# Patient Record
Sex: FEMALE | Race: WHITE | Employment: OTHER | URBAN - METROPOLITAN AREA
[De-identification: names, ages, dates, MRNs, and addresses within clinical notes are randomized per-mention and may not be internally consistent; named-entity substitution may affect disease eponyms.]

---

## 2019-02-20 ENCOUNTER — OFFICE VISIT (OUTPATIENT)
Dept: FAMILY MEDICINE CLINIC | Facility: CLINIC | Age: 84
End: 2019-02-20
Payer: MEDICARE

## 2019-02-20 DIAGNOSIS — Z23 NEED FOR VACCINATION: Primary | ICD-10-CM

## 2019-02-20 PROCEDURE — 90732 PPSV23 VACC 2 YRS+ SUBQ/IM: CPT | Performed by: NURSE PRACTITIONER

## 2019-02-20 PROCEDURE — G0009 ADMIN PNEUMOCOCCAL VACCINE: HCPCS | Performed by: NURSE PRACTITIONER

## 2019-02-20 RX ORDER — ENALAPRIL MALEATE AND HYDROCHLOROTHIAZIDE 10; 25 MG/1; MG/1
1 TABLET ORAL
Refills: 1 | COMMUNITY
Start: 2018-12-10

## 2019-02-20 RX ORDER — SIMVASTATIN 20 MG
20 TABLET ORAL
Refills: 2 | COMMUNITY
Start: 2019-01-30

## 2019-02-20 NOTE — PROGRESS NOTES
Pt presented requesting Pneumovax 23 vaccine; reports PCP said she needed vaccine but didn't have in stock at office yesterday during visit. Vaccination Screening Questionnaire filled out by patient and reviewed by myself.   No contraindications to vaccina

## 2020-11-02 ENCOUNTER — IMMUNIZATION (OUTPATIENT)
Dept: FAMILY MEDICINE CLINIC | Facility: CLINIC | Age: 85
End: 2020-11-02
Payer: MEDICARE

## 2020-11-02 PROCEDURE — 90662 IIV NO PRSV INCREASED AG IM: CPT | Performed by: NURSE PRACTITIONER

## 2020-11-02 PROCEDURE — G0008 ADMIN INFLUENZA VIRUS VAC: HCPCS | Performed by: NURSE PRACTITIONER

## 2021-03-12 ENCOUNTER — HOSPITAL ENCOUNTER (EMERGENCY)
Facility: HOSPITAL | Age: 86
Discharge: HOME | End: 2021-03-12
Attending: EMERGENCY MEDICINE
Payer: MEDICARE

## 2021-03-12 ENCOUNTER — APPOINTMENT (OUTPATIENT)
Dept: CT IMAGING | Facility: HOSPITAL | Age: 86
End: 2021-03-12
Attending: EMERGENCY MEDICINE
Payer: MEDICARE

## 2021-03-12 ENCOUNTER — APPOINTMENT (OUTPATIENT)
Dept: GENERAL RADIOLOGY | Facility: HOSPITAL | Age: 86
End: 2021-03-12
Attending: EMERGENCY MEDICINE
Payer: MEDICARE

## 2021-03-12 VITALS
TEMPERATURE: 98 F | SYSTOLIC BLOOD PRESSURE: 140 MMHG | OXYGEN SATURATION: 98 % | BODY MASS INDEX: 30 KG/M2 | DIASTOLIC BLOOD PRESSURE: 58 MMHG | HEART RATE: 79 BPM | WEIGHT: 174.19 LBS | RESPIRATION RATE: 20 BRPM

## 2021-03-12 DIAGNOSIS — K80.21 CALCULUS OF GALLBLADDER WITH BILIARY OBSTRUCTION BUT WITHOUT CHOLECYSTITIS: ICD-10-CM

## 2021-03-12 DIAGNOSIS — K82.8 PORCELAIN GALLBLADDER: ICD-10-CM

## 2021-03-12 DIAGNOSIS — R10.13 EPIGASTRIC PAIN: Primary | ICD-10-CM

## 2021-03-12 LAB
ALBUMIN SERPL-MCNC: 3.6 G/DL (ref 3.4–5)
ALBUMIN/GLOB SERPL: 1.1 {RATIO} (ref 1–2)
ALP LIVER SERPL-CCNC: 60 U/L
ALT SERPL-CCNC: 25 U/L
ANION GAP SERPL CALC-SCNC: 4 MMOL/L (ref 0–18)
APTT PPP: 31.1 SECONDS (ref 25.4–36.1)
AST SERPL-CCNC: 16 U/L (ref 15–37)
BASOPHILS # BLD AUTO: 0.01 X10(3) UL (ref 0–0.2)
BASOPHILS NFR BLD AUTO: 0.1 %
BILIRUB SERPL-MCNC: 0.4 MG/DL (ref 0.1–2)
BUN BLD-MCNC: 16 MG/DL (ref 7–18)
BUN/CREAT SERPL: 28.1 (ref 10–20)
CALCIUM BLD-MCNC: 9.1 MG/DL (ref 8.5–10.1)
CHLORIDE SERPL-SCNC: 99 MMOL/L (ref 98–112)
CO2 SERPL-SCNC: 30 MMOL/L (ref 21–32)
CREAT BLD-MCNC: 0.57 MG/DL
D-DIMER: 0.5 UG/ML FEU (ref ?–0.85)
DEPRECATED RDW RBC AUTO: 44.4 FL (ref 35.1–46.3)
EOSINOPHIL # BLD AUTO: 0.01 X10(3) UL (ref 0–0.7)
EOSINOPHIL NFR BLD AUTO: 0.1 %
ERYTHROCYTE [DISTWIDTH] IN BLOOD BY AUTOMATED COUNT: 13.3 % (ref 11–15)
GLOBULIN PLAS-MCNC: 3.4 G/DL (ref 2.8–4.4)
GLUCOSE BLD-MCNC: 115 MG/DL (ref 70–99)
HCT VFR BLD AUTO: 39.3 %
HGB BLD-MCNC: 13.2 G/DL
IMM GRANULOCYTES # BLD AUTO: 0.03 X10(3) UL (ref 0–1)
IMM GRANULOCYTES NFR BLD: 0.4 %
INR BLD: 0.9 (ref 0.89–1.11)
LIPASE SERPL-CCNC: 148 U/L (ref 73–393)
LYMPHOCYTES # BLD AUTO: 0.7 X10(3) UL (ref 1–4)
LYMPHOCYTES NFR BLD AUTO: 8.9 %
M PROTEIN MFR SERPL ELPH: 7 G/DL (ref 6.4–8.2)
MCH RBC QN AUTO: 30.5 PG (ref 26–34)
MCHC RBC AUTO-ENTMCNC: 33.6 G/DL (ref 31–37)
MCV RBC AUTO: 90.8 FL
MONOCYTES # BLD AUTO: 0.51 X10(3) UL (ref 0.1–1)
MONOCYTES NFR BLD AUTO: 6.5 %
NEUTROPHILS # BLD AUTO: 6.61 X10 (3) UL (ref 1.5–7.7)
NEUTROPHILS # BLD AUTO: 6.61 X10(3) UL (ref 1.5–7.7)
NEUTROPHILS NFR BLD AUTO: 84 %
NT-PROBNP SERPL-MCNC: 296 PG/ML (ref ?–450)
OSMOLALITY SERPL CALC.SUM OF ELEC: 278 MOSM/KG (ref 275–295)
PLATELET # BLD AUTO: 233 10(3)UL (ref 150–450)
POTASSIUM SERPL-SCNC: 3.6 MMOL/L (ref 3.5–5.1)
PSA SERPL DL<=0.01 NG/ML-MCNC: 12.4 SECONDS (ref 12.4–14.6)
RBC # BLD AUTO: 4.33 X10(6)UL
SARS-COV-2 RNA RESP QL NAA+PROBE: NOT DETECTED
SODIUM SERPL-SCNC: 133 MMOL/L (ref 136–145)
TROPONIN I SERPL-MCNC: <0.045 NG/ML (ref ?–0.04)
WBC # BLD AUTO: 7.9 X10(3) UL (ref 4–11)

## 2021-03-12 PROCEDURE — 84484 ASSAY OF TROPONIN QUANT: CPT | Performed by: EMERGENCY MEDICINE

## 2021-03-12 PROCEDURE — 93005 ELECTROCARDIOGRAM TRACING: CPT

## 2021-03-12 PROCEDURE — 85379 FIBRIN DEGRADATION QUANT: CPT | Performed by: EMERGENCY MEDICINE

## 2021-03-12 PROCEDURE — 71045 X-RAY EXAM CHEST 1 VIEW: CPT | Performed by: EMERGENCY MEDICINE

## 2021-03-12 PROCEDURE — 93010 ELECTROCARDIOGRAM REPORT: CPT

## 2021-03-12 PROCEDURE — 83690 ASSAY OF LIPASE: CPT | Performed by: EMERGENCY MEDICINE

## 2021-03-12 PROCEDURE — 80053 COMPREHEN METABOLIC PANEL: CPT | Performed by: EMERGENCY MEDICINE

## 2021-03-12 PROCEDURE — 83880 ASSAY OF NATRIURETIC PEPTIDE: CPT | Performed by: EMERGENCY MEDICINE

## 2021-03-12 PROCEDURE — 99285 EMERGENCY DEPT VISIT HI MDM: CPT

## 2021-03-12 PROCEDURE — 85730 THROMBOPLASTIN TIME PARTIAL: CPT | Performed by: EMERGENCY MEDICINE

## 2021-03-12 PROCEDURE — 85610 PROTHROMBIN TIME: CPT | Performed by: EMERGENCY MEDICINE

## 2021-03-12 PROCEDURE — 85025 COMPLETE CBC W/AUTO DIFF WBC: CPT | Performed by: EMERGENCY MEDICINE

## 2021-03-12 PROCEDURE — 74177 CT ABD & PELVIS W/CONTRAST: CPT | Performed by: EMERGENCY MEDICINE

## 2021-03-12 PROCEDURE — 96374 THER/PROPH/DIAG INJ IV PUSH: CPT

## 2021-03-12 RX ORDER — KETOROLAC TROMETHAMINE 30 MG/ML
15 INJECTION, SOLUTION INTRAMUSCULAR; INTRAVENOUS ONCE
Status: COMPLETED | OUTPATIENT
Start: 2021-03-12 | End: 2021-03-12

## 2021-03-12 NOTE — ED INITIAL ASSESSMENT (HPI)
Per patient, epigastric pain that started last night and has not gone away. Stated after a large meal with sauteed onions that usually triggers her GERD. + nausea. Denies other complaints at this time.

## 2021-03-13 NOTE — ED NOTES
Patient reports pain is improving, would like to go home. MD updated, patient given water as requested.

## 2021-03-13 NOTE — ED PROVIDER NOTES
Patient Seen in: [facility] Emergency Department      History   Patient presents with:  Chest Pain Angina    Stated Complaint: Chest pain; epigastric pain    HPI/Subjective:   HPI    44-year-old female presents emergency room with chief complaint of awake, alert, well-appearing, in no acute distress. HEENT:  no scleral icterus. Mucous membranes are moist, oropharynx is clear, uvula midline. HEART: Regular rate and rhythm, no murmurs. LUNGS: Clear to auscultation bilaterally.   No Rales, no rhonch established, placed on cardiac monitor and pulse ox. Chest x-ray performed, no acute cardiopulmonary process. Blood work performed, chemistry sodium 133 potassium 3.6 BUN 16 creatinine 0.57 glucose 115. Lipase 148. Troponin less than 0.045. D-dimer 0.

## 2021-03-15 LAB
ATRIAL RATE: 71 BPM
P AXIS: 57 DEGREES
P-R INTERVAL: 168 MS
Q-T INTERVAL: 390 MS
QRS DURATION: 82 MS
QTC CALCULATION (BEZET): 423 MS
R AXIS: 6 DEGREES
T AXIS: 45 DEGREES
VENTRICULAR RATE: 71 BPM

## 2021-03-19 ENCOUNTER — OFFICE VISIT (OUTPATIENT)
Dept: SURGERY | Facility: CLINIC | Age: 86
End: 2021-03-19
Payer: MEDICARE

## 2021-03-19 VITALS — WEIGHT: 169.5 LBS | BODY MASS INDEX: 28.94 KG/M2 | TEMPERATURE: 97 F | HEIGHT: 64 IN

## 2021-03-19 DIAGNOSIS — K80.20 CALCULUS OF GALLBLADDER WITHOUT CHOLECYSTITIS WITHOUT OBSTRUCTION: ICD-10-CM

## 2021-03-19 DIAGNOSIS — K82.8 PORCELAIN GALLBLADDER: Primary | ICD-10-CM

## 2021-03-19 PROCEDURE — 99205 OFFICE O/P NEW HI 60 MIN: CPT | Performed by: SURGERY

## 2021-03-24 NOTE — H&P
New Patient Visit Note       Active Problems      1. Porcelain gallbladder    2. Calculus of gallbladder without cholecystitis without obstruction        Chief Complaint   Patient presents with:  Gallbladder: NW PT ref by ER for gallbladder.  PT denies any not bruise/bleed easily. Psychiatric/Behavioral: Negative for confusion, hallucinations and sleep disturbance.        Physical Findings   Temp 97.2 °F (36.2 °C)   Ht 64\"   Wt 169 lb 8 oz (76.9 kg)   BMI 29.09 kg/m²   Physical Exam  Constitutional: pain, abdominal bloating and soft stools.     On examination, her abdomen is soft, nondistended, nontender to deep palpation    Patient has no prior history of any surgery    Family history-mother had cholecystectomy, brother diagnosed with pancreatic carci Referrals   None    Follow Up  No follow-ups on file.     Chasidy Rojas MD

## 2021-04-02 ENCOUNTER — IMMUNIZATION (OUTPATIENT)
Dept: LAB | Age: 86
End: 2021-04-02
Attending: HOSPITALIST
Payer: MEDICARE

## 2021-04-02 DIAGNOSIS — Z23 NEED FOR VACCINATION: Primary | ICD-10-CM

## 2021-04-02 PROCEDURE — 0001A SARSCOV2 VAC 30MCG/0.3ML IM: CPT

## 2021-04-23 ENCOUNTER — IMMUNIZATION (OUTPATIENT)
Dept: LAB | Age: 86
End: 2021-04-23
Attending: HOSPITALIST
Payer: MEDICARE

## 2021-04-23 DIAGNOSIS — Z23 NEED FOR VACCINATION: Primary | ICD-10-CM

## 2021-04-23 PROCEDURE — 0002A SARSCOV2 VAC 30MCG/0.3ML IM: CPT

## 2022-01-06 ENCOUNTER — IMMUNIZATION (OUTPATIENT)
Dept: LAB | Facility: HOSPITAL | Age: 87
End: 2022-01-06
Attending: EMERGENCY MEDICINE
Payer: MEDICARE

## 2022-01-06 DIAGNOSIS — Z23 NEED FOR VACCINATION: Primary | ICD-10-CM

## 2022-01-06 PROCEDURE — 0004A SARSCOV2 VAC 30MCG/0.3ML IM: CPT

## 2022-01-06 PROCEDURE — 0054A SARSCOV2 VAC 30MCG/0.3ML IM: CPT

## 2023-01-05 ENCOUNTER — HOSPITAL ENCOUNTER (OUTPATIENT)
Dept: MRI IMAGING | Age: 88
Discharge: HOME | End: 2023-01-05
Attending: INTERNAL MEDICINE
Payer: MEDICARE

## 2023-01-05 ENCOUNTER — HOSPITAL ENCOUNTER (OUTPATIENT)
Dept: MRI IMAGING | Age: 88
Discharge: HOME | DRG: 540 | End: 2023-01-05
Attending: INTERNAL MEDICINE
Payer: MEDICARE

## 2023-01-05 DIAGNOSIS — L03.011 CELLULITIS OF RIGHT RING FINGER: ICD-10-CM

## 2023-01-05 PROCEDURE — 73218 MRI UPPER EXTREMITY W/O DYE: CPT | Performed by: INTERNAL MEDICINE

## 2023-01-06 ENCOUNTER — HOSPITAL ENCOUNTER (INPATIENT)
Facility: HOSPITAL | Age: 88
LOS: 3 days | Discharge: HOME | End: 2023-01-09
Attending: EMERGENCY MEDICINE | Admitting: HOSPITALIST
Payer: MEDICARE

## 2023-01-06 ENCOUNTER — APPOINTMENT (OUTPATIENT)
Dept: GENERAL RADIOLOGY | Facility: HOSPITAL | Age: 88
DRG: 540 | End: 2023-01-06
Attending: EMERGENCY MEDICINE
Payer: MEDICARE

## 2023-01-06 ENCOUNTER — HOSPITAL ENCOUNTER (INPATIENT)
Facility: HOSPITAL | Age: 88
LOS: 3 days | Discharge: HOME | DRG: 540 | End: 2023-01-09
Attending: EMERGENCY MEDICINE | Admitting: HOSPITALIST
Payer: MEDICARE

## 2023-01-06 ENCOUNTER — APPOINTMENT (OUTPATIENT)
Dept: GENERAL RADIOLOGY | Facility: HOSPITAL | Age: 88
End: 2023-01-06
Attending: EMERGENCY MEDICINE
Payer: MEDICARE

## 2023-01-06 DIAGNOSIS — M86.9 OSTEOMYELITIS OF RIGHT HAND, UNSPECIFIED TYPE (HCC): Primary | ICD-10-CM

## 2023-01-06 DIAGNOSIS — L03.011 CELLULITIS OF RIGHT FINGER: ICD-10-CM

## 2023-01-06 LAB
ALBUMIN SERPL-MCNC: 3.4 G/DL (ref 3.4–5)
ALBUMIN/GLOB SERPL: 1 {RATIO} (ref 1–2)
ALP LIVER SERPL-CCNC: 61 U/L
ALT SERPL-CCNC: 21 U/L
ANION GAP SERPL CALC-SCNC: 3 MMOL/L (ref 0–18)
AST SERPL-CCNC: 20 U/L (ref 15–37)
BASOPHILS # BLD AUTO: 0.02 X10(3) UL (ref 0–0.2)
BASOPHILS NFR BLD AUTO: 0.4 %
BILIRUB SERPL-MCNC: 0.5 MG/DL (ref 0.1–2)
BUN BLD-MCNC: 20 MG/DL (ref 7–18)
CALCIUM BLD-MCNC: 9.3 MG/DL (ref 8.5–10.1)
CHLORIDE SERPL-SCNC: 104 MMOL/L (ref 98–112)
CO2 SERPL-SCNC: 32 MMOL/L (ref 21–32)
CREAT BLD-MCNC: 0.68 MG/DL
CRP SERPL-MCNC: <0.29 MG/DL (ref ?–0.3)
EOSINOPHIL # BLD AUTO: 0.03 X10(3) UL (ref 0–0.7)
EOSINOPHIL NFR BLD AUTO: 0.7 %
ERYTHROCYTE [DISTWIDTH] IN BLOOD BY AUTOMATED COUNT: 15 %
ERYTHROCYTE [SEDIMENTATION RATE] IN BLOOD: 26 MM/HR
GFR SERPLBLD BASED ON 1.73 SQ M-ARVRAT: 84 ML/MIN/1.73M2 (ref 60–?)
GLOBULIN PLAS-MCNC: 3.5 G/DL (ref 2.8–4.4)
GLUCOSE BLD-MCNC: 116 MG/DL (ref 70–99)
HCT VFR BLD AUTO: 40.7 %
HGB BLD-MCNC: 13.3 G/DL
IMM GRANULOCYTES # BLD AUTO: 0.02 X10(3) UL (ref 0–1)
IMM GRANULOCYTES NFR BLD: 0.4 %
LYMPHOCYTES # BLD AUTO: 0.91 X10(3) UL (ref 1–4)
LYMPHOCYTES NFR BLD AUTO: 19.8 %
MCH RBC QN AUTO: 29.6 PG (ref 26–34)
MCHC RBC AUTO-ENTMCNC: 32.7 G/DL (ref 31–37)
MCV RBC AUTO: 90.6 FL
MONOCYTES # BLD AUTO: 0.47 X10(3) UL (ref 0.1–1)
MONOCYTES NFR BLD AUTO: 10.2 %
NEUTROPHILS # BLD AUTO: 3.14 X10 (3) UL (ref 1.5–7.7)
NEUTROPHILS # BLD AUTO: 3.14 X10(3) UL (ref 1.5–7.7)
NEUTROPHILS NFR BLD AUTO: 68.5 %
OSMOLALITY SERPL CALC.SUM OF ELEC: 292 MOSM/KG (ref 275–295)
PLATELET # BLD AUTO: 283 10(3)UL (ref 150–450)
POTASSIUM SERPL-SCNC: 3.9 MMOL/L (ref 3.5–5.1)
PROT SERPL-MCNC: 6.9 G/DL (ref 6.4–8.2)
RBC # BLD AUTO: 4.49 X10(6)UL
SARS-COV-2 RNA RESP QL NAA+PROBE: NOT DETECTED
SODIUM SERPL-SCNC: 139 MMOL/L (ref 136–145)
WBC # BLD AUTO: 4.6 X10(3) UL (ref 4–11)

## 2023-01-06 PROCEDURE — 85025 COMPLETE CBC W/AUTO DIFF WBC: CPT | Performed by: EMERGENCY MEDICINE

## 2023-01-06 PROCEDURE — 87081 CULTURE SCREEN ONLY: CPT | Performed by: HOSPITALIST

## 2023-01-06 PROCEDURE — 86140 C-REACTIVE PROTEIN: CPT | Performed by: EMERGENCY MEDICINE

## 2023-01-06 PROCEDURE — 36415 COLL VENOUS BLD VENIPUNCTURE: CPT

## 2023-01-06 PROCEDURE — 99285 EMERGENCY DEPT VISIT HI MDM: CPT

## 2023-01-06 PROCEDURE — 73140 X-RAY EXAM OF FINGER(S): CPT | Performed by: EMERGENCY MEDICINE

## 2023-01-06 PROCEDURE — 85652 RBC SED RATE AUTOMATED: CPT | Performed by: EMERGENCY MEDICINE

## 2023-01-06 PROCEDURE — 80053 COMPREHEN METABOLIC PANEL: CPT | Performed by: EMERGENCY MEDICINE

## 2023-01-06 RX ORDER — ENEMA 19; 7 G/133ML; G/133ML
1 ENEMA RECTAL ONCE AS NEEDED
Status: DISCONTINUED | OUTPATIENT
Start: 2023-01-06 | End: 2023-01-09

## 2023-01-06 RX ORDER — ATORVASTATIN CALCIUM 10 MG/1
10 TABLET, FILM COATED ORAL NIGHTLY
Status: DISCONTINUED | OUTPATIENT
Start: 2023-01-06 | End: 2023-01-09

## 2023-01-06 RX ORDER — ACETAMINOPHEN 500 MG
500 TABLET ORAL EVERY 4 HOURS PRN
Status: DISCONTINUED | OUTPATIENT
Start: 2023-01-06 | End: 2023-01-09

## 2023-01-06 RX ORDER — BISACODYL 10 MG
10 SUPPOSITORY, RECTAL RECTAL
Status: DISCONTINUED | OUTPATIENT
Start: 2023-01-06 | End: 2023-01-09

## 2023-01-06 RX ORDER — HYDROCHLOROTHIAZIDE 25 MG/1
25 TABLET ORAL DAILY
COMMUNITY

## 2023-01-06 RX ORDER — HEPARIN SODIUM 5000 [USP'U]/ML
5000 INJECTION, SOLUTION INTRAVENOUS; SUBCUTANEOUS EVERY 8 HOURS SCHEDULED
Status: COMPLETED | OUTPATIENT
Start: 2023-01-06 | End: 2023-01-07

## 2023-01-06 RX ORDER — IBUPROFEN 400 MG/1
400 TABLET ORAL EVERY 6 HOURS PRN
Status: DISCONTINUED | OUTPATIENT
Start: 2023-01-06 | End: 2023-01-09

## 2023-01-06 RX ORDER — SENNOSIDES 8.6 MG
17.2 TABLET ORAL NIGHTLY PRN
Status: DISCONTINUED | OUTPATIENT
Start: 2023-01-06 | End: 2023-01-09

## 2023-01-06 RX ORDER — IBUPROFEN 200 MG
400 TABLET ORAL EVERY 6 HOURS PRN
COMMUNITY

## 2023-01-06 RX ORDER — POLYETHYLENE GLYCOL 3350 17 G/17G
17 POWDER, FOR SOLUTION ORAL DAILY PRN
Status: DISCONTINUED | OUTPATIENT
Start: 2023-01-06 | End: 2023-01-09

## 2023-01-06 RX ORDER — ENALAPRIL MALEATE AND HYDROCHLOROTHIAZIDE 10; 25 MG/1; MG/1
1 TABLET ORAL
Status: CANCELLED | OUTPATIENT
Start: 2023-01-07

## 2023-01-06 RX ORDER — ONDANSETRON 2 MG/ML
4 INJECTION INTRAMUSCULAR; INTRAVENOUS EVERY 6 HOURS PRN
Status: DISCONTINUED | OUTPATIENT
Start: 2023-01-06 | End: 2023-01-09

## 2023-01-06 RX ORDER — METOCLOPRAMIDE HYDROCHLORIDE 5 MG/ML
10 INJECTION INTRAMUSCULAR; INTRAVENOUS EVERY 8 HOURS PRN
Status: DISCONTINUED | OUTPATIENT
Start: 2023-01-06 | End: 2023-01-09

## 2023-01-06 RX ORDER — ENALAPRIL MALEATE 10 MG/1
10 TABLET ORAL DAILY
COMMUNITY

## 2023-01-06 NOTE — ED QUICK NOTES
Patient is alert and awake oriented x4  Complaining of pain and swelling with redness in right ring finger since one month  Already completed one course of antibiotic . Her doctor started clindamycin orally but she stopped after 2 doses because of diarrhea , no fever able to move the finger.

## 2023-01-06 NOTE — PROGRESS NOTES
NURSING ADMISSION NOTE      Patient admitted via cart from ED  Oriented to room. Safety precautions initiated. Bed in low position. Call light in reach. A&Ox4, VSS on room air. Denies pain or discomfort. Resting in bed, NPO at midnight.

## 2023-01-06 NOTE — ED QUICK NOTES
Orders for admission, patient is aware of plan and ready to go upstairs.  Any questions, please call ED RN Montse Jones at extension [phone]    Patient Covid vaccination status: Fully vaccinated     COVID Test Ordered in ED: Rapid SARS-CoV-2 by PCR    COVID Suspicion at Admission: N/A    Running Infusions:  None    Mental Status/LOC at time of transport:oriented x4    Other pertinent information: na  CIWA score: N/A   NIH score:  N/A

## 2023-01-07 ENCOUNTER — ANESTHESIA (OUTPATIENT)
Dept: SURGERY | Facility: HOSPITAL | Age: 88
End: 2023-01-07
Payer: MEDICARE

## 2023-01-07 ENCOUNTER — ANESTHESIA EVENT (OUTPATIENT)
Dept: SURGERY | Facility: HOSPITAL | Age: 88
End: 2023-01-07
Payer: MEDICARE

## 2023-01-07 LAB
ANION GAP SERPL CALC-SCNC: 4 MMOL/L (ref 0–18)
BASOPHILS # BLD AUTO: 0.02 X10(3) UL (ref 0–0.2)
BASOPHILS NFR BLD AUTO: 0.5 %
BUN BLD-MCNC: 18 MG/DL (ref 7–18)
CALCIUM BLD-MCNC: 9 MG/DL (ref 8.5–10.1)
CHLORIDE SERPL-SCNC: 107 MMOL/L (ref 98–112)
CO2 SERPL-SCNC: 28 MMOL/L (ref 21–32)
CREAT BLD-MCNC: 0.56 MG/DL
EOSINOPHIL # BLD AUTO: 0.05 X10(3) UL (ref 0–0.7)
EOSINOPHIL NFR BLD AUTO: 1.3 %
ERYTHROCYTE [DISTWIDTH] IN BLOOD BY AUTOMATED COUNT: 15 %
GFR SERPLBLD BASED ON 1.73 SQ M-ARVRAT: 88 ML/MIN/1.73M2 (ref 60–?)
GLUCOSE BLD-MCNC: 103 MG/DL (ref 70–99)
HCT VFR BLD AUTO: 36.9 %
HGB BLD-MCNC: 12 G/DL
IMM GRANULOCYTES # BLD AUTO: 0.01 X10(3) UL (ref 0–1)
IMM GRANULOCYTES NFR BLD: 0.3 %
LYMPHOCYTES # BLD AUTO: 1.13 X10(3) UL (ref 1–4)
LYMPHOCYTES NFR BLD AUTO: 28.9 %
MCH RBC QN AUTO: 29.4 PG (ref 26–34)
MCHC RBC AUTO-ENTMCNC: 32.5 G/DL (ref 31–37)
MCV RBC AUTO: 90.4 FL
MONOCYTES # BLD AUTO: 0.46 X10(3) UL (ref 0.1–1)
MONOCYTES NFR BLD AUTO: 11.8 %
NEUTROPHILS # BLD AUTO: 2.24 X10 (3) UL (ref 1.5–7.7)
NEUTROPHILS # BLD AUTO: 2.24 X10(3) UL (ref 1.5–7.7)
NEUTROPHILS NFR BLD AUTO: 57.2 %
OSMOLALITY SERPL CALC.SUM OF ELEC: 290 MOSM/KG (ref 275–295)
PLATELET # BLD AUTO: 233 10(3)UL (ref 150–450)
POTASSIUM SERPL-SCNC: 3.7 MMOL/L (ref 3.5–5.1)
RBC # BLD AUTO: 4.08 X10(6)UL
SODIUM SERPL-SCNC: 139 MMOL/L (ref 136–145)
WBC # BLD AUTO: 3.9 X10(3) UL (ref 4–11)

## 2023-01-07 PROCEDURE — 87205 SMEAR GRAM STAIN: CPT | Performed by: ORTHOPAEDIC SURGERY

## 2023-01-07 PROCEDURE — 87186 SC STD MICRODIL/AGAR DIL: CPT | Performed by: ORTHOPAEDIC SURGERY

## 2023-01-07 PROCEDURE — 87176 TISSUE HOMOGENIZATION CULTR: CPT | Performed by: ORTHOPAEDIC SURGERY

## 2023-01-07 PROCEDURE — 0X9J0ZZ DRAINAGE OF RIGHT HAND, OPEN APPROACH: ICD-10-PCS | Performed by: ORTHOPAEDIC SURGERY

## 2023-01-07 PROCEDURE — 87077 CULTURE AEROBIC IDENTIFY: CPT | Performed by: ORTHOPAEDIC SURGERY

## 2023-01-07 PROCEDURE — 80048 BASIC METABOLIC PNL TOTAL CA: CPT | Performed by: HOSPITALIST

## 2023-01-07 PROCEDURE — 85025 COMPLETE CBC W/AUTO DIFF WBC: CPT | Performed by: HOSPITALIST

## 2023-01-07 PROCEDURE — 87070 CULTURE OTHR SPECIMN AEROBIC: CPT | Performed by: ORTHOPAEDIC SURGERY

## 2023-01-07 PROCEDURE — 87075 CULTR BACTERIA EXCEPT BLOOD: CPT | Performed by: ORTHOPAEDIC SURGERY

## 2023-01-07 RX ORDER — LIDOCAINE HYDROCHLORIDE 10 MG/ML
INJECTION, SOLUTION EPIDURAL; INFILTRATION; INTRACAUDAL; PERINEURAL AS NEEDED
Status: DISCONTINUED | OUTPATIENT
Start: 2023-01-07 | End: 2023-01-07 | Stop reason: SURG

## 2023-01-07 RX ORDER — HEPARIN SODIUM 5000 [USP'U]/ML
5000 INJECTION, SOLUTION INTRAVENOUS; SUBCUTANEOUS EVERY 8 HOURS SCHEDULED
Status: DISCONTINUED | OUTPATIENT
Start: 2023-01-07 | End: 2023-01-07

## 2023-01-07 RX ORDER — HEPARIN SODIUM 5000 [USP'U]/ML
5000 INJECTION, SOLUTION INTRAVENOUS; SUBCUTANEOUS EVERY 8 HOURS SCHEDULED
Status: DISCONTINUED | OUTPATIENT
Start: 2023-01-07 | End: 2023-01-09

## 2023-01-07 RX ORDER — MIDAZOLAM HYDROCHLORIDE 1 MG/ML
1 INJECTION INTRAMUSCULAR; INTRAVENOUS EVERY 5 MIN PRN
Status: DISCONTINUED | OUTPATIENT
Start: 2023-01-07 | End: 2023-01-07 | Stop reason: HOSPADM

## 2023-01-07 RX ORDER — HYDROMORPHONE HYDROCHLORIDE 1 MG/ML
0.6 INJECTION, SOLUTION INTRAMUSCULAR; INTRAVENOUS; SUBCUTANEOUS EVERY 5 MIN PRN
Status: DISCONTINUED | OUTPATIENT
Start: 2023-01-07 | End: 2023-01-07 | Stop reason: HOSPADM

## 2023-01-07 RX ORDER — HYDROMORPHONE HYDROCHLORIDE 1 MG/ML
0.2 INJECTION, SOLUTION INTRAMUSCULAR; INTRAVENOUS; SUBCUTANEOUS EVERY 5 MIN PRN
Status: DISCONTINUED | OUTPATIENT
Start: 2023-01-07 | End: 2023-01-07 | Stop reason: HOSPADM

## 2023-01-07 RX ORDER — ACETAMINOPHEN 500 MG
1000 TABLET ORAL ONCE AS NEEDED
Status: DISCONTINUED | OUTPATIENT
Start: 2023-01-07 | End: 2023-01-07 | Stop reason: HOSPADM

## 2023-01-07 RX ORDER — VANCOMYCIN HYDROCHLORIDE 1 G/20ML
INJECTION, POWDER, LYOPHILIZED, FOR SOLUTION INTRAVENOUS AS NEEDED
Status: DISCONTINUED | OUTPATIENT
Start: 2023-01-07 | End: 2023-01-07 | Stop reason: HOSPADM

## 2023-01-07 RX ORDER — SODIUM CHLORIDE, SODIUM LACTATE, POTASSIUM CHLORIDE, CALCIUM CHLORIDE 600; 310; 30; 20 MG/100ML; MG/100ML; MG/100ML; MG/100ML
INJECTION, SOLUTION INTRAVENOUS CONTINUOUS
Status: DISCONTINUED | OUTPATIENT
Start: 2023-01-07 | End: 2023-01-07 | Stop reason: HOSPADM

## 2023-01-07 RX ORDER — NALOXONE HYDROCHLORIDE 0.4 MG/ML
80 INJECTION, SOLUTION INTRAMUSCULAR; INTRAVENOUS; SUBCUTANEOUS AS NEEDED
Status: DISCONTINUED | OUTPATIENT
Start: 2023-01-07 | End: 2023-01-07 | Stop reason: HOSPADM

## 2023-01-07 RX ORDER — HYDROMORPHONE HYDROCHLORIDE 1 MG/ML
0.4 INJECTION, SOLUTION INTRAMUSCULAR; INTRAVENOUS; SUBCUTANEOUS EVERY 5 MIN PRN
Status: DISCONTINUED | OUTPATIENT
Start: 2023-01-07 | End: 2023-01-07 | Stop reason: HOSPADM

## 2023-01-07 RX ORDER — HYDROCODONE BITARTRATE AND ACETAMINOPHEN 5; 325 MG/1; MG/1
1 TABLET ORAL ONCE AS NEEDED
Status: DISCONTINUED | OUTPATIENT
Start: 2023-01-07 | End: 2023-01-07 | Stop reason: HOSPADM

## 2023-01-07 RX ORDER — ONDANSETRON 2 MG/ML
4 INJECTION INTRAMUSCULAR; INTRAVENOUS EVERY 6 HOURS PRN
Status: DISCONTINUED | OUTPATIENT
Start: 2023-01-07 | End: 2023-01-07 | Stop reason: HOSPADM

## 2023-01-07 RX ORDER — HYDROMORPHONE HYDROCHLORIDE 1 MG/ML
INJECTION, SOLUTION INTRAMUSCULAR; INTRAVENOUS; SUBCUTANEOUS
Status: COMPLETED
Start: 2023-01-07 | End: 2023-01-07

## 2023-01-07 RX ORDER — METOCLOPRAMIDE HYDROCHLORIDE 5 MG/ML
10 INJECTION INTRAMUSCULAR; INTRAVENOUS EVERY 8 HOURS PRN
Status: DISCONTINUED | OUTPATIENT
Start: 2023-01-07 | End: 2023-01-07 | Stop reason: HOSPADM

## 2023-01-07 RX ORDER — HYDROCODONE BITARTRATE AND ACETAMINOPHEN 5; 325 MG/1; MG/1
2 TABLET ORAL ONCE AS NEEDED
Status: DISCONTINUED | OUTPATIENT
Start: 2023-01-07 | End: 2023-01-07 | Stop reason: HOSPADM

## 2023-01-07 RX ORDER — ONDANSETRON 2 MG/ML
INJECTION INTRAMUSCULAR; INTRAVENOUS AS NEEDED
Status: DISCONTINUED | OUTPATIENT
Start: 2023-01-07 | End: 2023-01-07 | Stop reason: SURG

## 2023-01-07 RX ADMIN — ONDANSETRON 4 MG: 2 INJECTION INTRAMUSCULAR; INTRAVENOUS at 13:24:00

## 2023-01-07 RX ADMIN — LIDOCAINE HYDROCHLORIDE 100 MG: 10 INJECTION, SOLUTION EPIDURAL; INFILTRATION; INTRACAUDAL; PERINEURAL at 13:19:00

## 2023-01-07 NOTE — OPERATIVE REPORT
[facility]    PATIENT'S NAME: [email or web address][email or web address]   ATTENDING PHYSICIAN: Reeta Duverney, MD   OPERATING PHYSICIAN: Yoli Liao M.D. 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 PROCEDURE:  Right fourth finger irrigation of distal interphalangeal joint. ASSISTANT:  None. ANESTHESIA:  General.      DRAINS:  None. SPECIMEN:  Culture swabs sent. BLOOD LOSS:  Minimal.    COMPLICATIONS:  None. INDICATIONS:  The patient is an 69-year-old female who developed pain and swelling in the finger about a month ago. She was treated with Keflex and did improve somewhat. She then was placed on clindamycin and had diarrhea. She was sent to the ER. She was admitted and I was consulted for treatment options. The MRI showed likely osteomyelitis in the distal phalanx and the middle phalanx, and possible septic arthritis at the DIP joint. We placed her on antibiotics overnight because the finger actually did not look that bad yesterday. However, when I around rounded on her this morning, I thought there was a little more swelling and a little more redness at the DIP joint. She still did not have much pain. We had already scheduled the surgery, so I told her I would go ahead and wash out the DIP joint to help eradicate any potential septic joint. The osteomyelitis would be treated with long-term antibiotics. We discussed the surgery itself. We discussed the risks.   Informed consent was obtained to proceed with the procedure. OPERATIVE TECHNIQUE:  The patient was identified in the preoperative holding area and the correct finger, which was the right fourth finger, was identified and marked. She was already on IV antibiotics. She was taken to the operative suite and placed supine on the operative table. Dr. Willy Gonzalez provided a general anesthetic. He then had to reestablish an IV in the left arm because the IV had been placed in the right antecubital fossa. Then, the right upper extremity was elevated and exsanguinated, tourniquet placed and tourniquet inflated to 250 mmHg. Right upper extremity was then prepped and draped in the usual sterile fashion. Time-out was performed confirming that she was the correct patient, right fourth finger was the correct site, and irrigation of the DIP joint was the correct procedure. I made a 1-inch incision over the dorsomedial aspect of the DIP joint. Dissection carefully taken down to the joint. I was able to use a Ana Mily to open the joint a bit. I then irrigated out with 1 L of fluid which had vancomycin. Tourniquet was released. Excellent hemostasis confirmed. Closure was performed with 4-0 nylon. Of note, I did take swab cultures from the tiny little bit of fluid that was there. A sterile dressing composed of Xeroform gauze, sterile gauze, tube gauze, and Webril was applied. All counts were correct. The patient tolerated the procedure well with no intraoperative complications. DISPOSITION:  Patient was awakened from her anesthetic and taken to the recovery room in stable condition. Of note, upon release of the tourniquet she had great capillary refill in the nail bed and in the fingertip. She will be readmitted to resume IV antibiotics.       Dictated By Kiana Emerson M.D.  d: 01/07/2023 14:06:42  t: 01/07/2023 14:12:39  Job [id number]  EN/

## 2023-01-07 NOTE — PLAN OF CARE
Assumed pt care. Pt back from pacu. Is alert, on 1L oxygen via nc. C/o of mild nausea,meds given. Dressing to rt 4th finger with guaze and coban cdi. Pt denies nay pain at the moment. Poc update with pt and son and they both verbalized understanding.

## 2023-01-07 NOTE — BRIEF OP NOTE
Pre-Operative Diagnosis: Cellulitis of right 4th finger with osteomyelitis and possible septic DIP joint     Post-Operative Diagnosis:same      Procedure Performed:   RIGHT FOURTH FINGER INCISION & DRAINAGE    Surgeon(s) and Role:     * Aliya Hobson MD - Primary    Assistant(s):        Surgical Findings: per op note     Specimen: swabs for culture     Estimated Blood Loss: 2cc    Dictation Number:      Yoli Liao MD  1/7/2023  2:07 PM

## 2023-01-07 NOTE — CONSULTS
[facility]    PATIENT'S NAME: [email or web address]   ATTENDING PHYSICIAN: Juan F David MD   CONSULTING PHYSICIAN: Bart Donis M.D. PATIENT ACCOUNT#:   [de-identified]    LOCATION:  [address] [place] [address]  MEDICAL RECORD #:   [id number]       YOB: 1935  ADMISSION DATE:       01/06/2023      CONSULT DATE:  01/06/2023    REPORT OF CONSULTATION    REASON FOR CONSULTATION:  I was consulted regarding infection in the right fourth finger. HISTORY OF PRESENT ILLNESS:  The patient is an 80-year-old female who has had some issues with this finger for about 1 month now. She says there was a little bit of a blister and then it turned red. Her primary care physician placed her on Keflex and greatly improved. She was then placed on clindamycin and after a couple of days, she developed diarrhea, so she was told to stop taking that and go to the [facility]. She was admitted for further treatment. She has already been evaluated by Infectious Disease who recommends IV vancomycin and cefepime. She has a little bit of pain when she moves the finger but not that much. She says it is actually looking a lot better today. PAST MEDICAL HISTORY:  Hyperlipidemia, hypertension. MEDICATIONS:  Reviewed in Epic. ALLERGIES:  No known drug allergies. PHYSICAL EXAMINATION:    GENERAL:  She is lying comfortably in her hospital bed. VITAL SIGNS:  She is afebrile. Vital signs are stable. EXTREMITIES:  She has very little swelling at the DIP joint. There is no significant erythema about the finger at all. She has good active motion at the DIP and PIP joints with minimal discomfort. I was able to flex and extend the DIP joint of vigorously and it really did not generate much pain. The skin is intact. It actually does not look that swollen or that bad at all. LABORATORY DATA:  C-reactive protein is less than 0.29. Sedimentation rate is 26. White blood cell count is 4.6, hemoglobin was 13. 3.     MRI:  She had an MRI which does appear to show some osteo in the distal phalanx and at least longterm up the middle phalanx. The radiologist felt there is a lot of fluid in the joint concerning for septic DIP joint, but I am not seeing that much fluid in the joint as I evaluate the MRI. Certainly, there is the edema in the bone. There is some soft tissue swelling surrounding the middle phalanx. IMPRESSION:  Apparent osteomyelitis of the right fourth finger distal and middle phalanges with possibility of septic arthritis at the distal interphalangeal joint, but clinically it does not present as a septic joint and I do not see a lot of fluid on the MRI. PLAN:  I had a long discussion with the patient. We initially contemplated taking her to the OR for irrigation and debridement of the DIP joint. I had my hand specialty partner, Dr. Nate Song, take a look at the MRI. Based on the clinical presentation, we felt it might be more advisable to get her on IV antibiotics and see what progress she makes. We had also discussed potential amputation due to the osteomyelitis, but again, the finger actually does not look clinically that bad at all. The patient was in agreement that she would prefer to not have surgery if at all possible, certainly not amputation. We will have her start on IV antibiotics recommended per Infectious Disease and follow her clinically. If it starts to swell up or become very red at all, then our plan will have to change to at least do an I and D of the DIP joint. We will check on her again in the morning and see how she is doing. I did schedule a case for tomorrow so that if it looks worse, we can go ahead and do a washout of the DIP joint.      Dictated By Tanna Barnett M.D.  d: 01/06/2023 20:13:28  t: 01/06/2023 20:50:37  Salvador Márquez [id number]  /

## 2023-01-07 NOTE — CONSULTS
Consult dictated. The finger does not look like a septic joint clinically. We decided together to start the IV abx per ID and follow her clinically. If she starts to swell or have much pain we can wash out the DIP joint.

## 2023-01-07 NOTE — PLAN OF CARE
Patient A&Ox4, VSS on room air. WALTER to OR for I & D R fourth finger. Voiding freely, LBM 1/5, denies pain or discomfort. All needs met.

## 2023-01-07 NOTE — ANESTHESIA PROCEDURE NOTES
Airway  Date/Time: 1/7/2023 1:20 PM  Urgency: elective      General Information and Staff    Patient location during procedure: OR  Anesthesiologist: David Keys MD  Performed: anesthesiologist     Indications and Patient Condition  Indications for airway management: anesthesia  Sedation level: deep  Preoxygenated: yes  Patient position: sniffing  Mask difficulty assessment: 0 - not attempted    Final Airway Details  Final airway type: supraglottic airway      Successful airway: classic  Size 3       Number of attempts at approach: 1

## 2023-01-07 NOTE — PLAN OF CARE
Patient A & O x4. VSS, on RA. Denies any pain at this time. Right ring finger swollen, red and purple. Up standby assist. Voiding freely. Ortho does not want to do any surgical interventions at this time. ID aware, orders to start IV abx obtained, pharmacy to dose. Safety measures in place. Instructed to use call light.

## 2023-01-08 LAB
ANION GAP SERPL CALC-SCNC: 6 MMOL/L (ref 0–18)
BASOPHILS # BLD AUTO: 0.02 X10(3) UL (ref 0–0.2)
BASOPHILS NFR BLD AUTO: 0.5 %
BUN BLD-MCNC: 15 MG/DL (ref 7–18)
CALCIUM BLD-MCNC: 9.2 MG/DL (ref 8.5–10.1)
CHLORIDE SERPL-SCNC: 106 MMOL/L (ref 98–112)
CO2 SERPL-SCNC: 27 MMOL/L (ref 21–32)
CREAT BLD-MCNC: 0.56 MG/DL
EOSINOPHIL # BLD AUTO: 0.04 X10(3) UL (ref 0–0.7)
EOSINOPHIL NFR BLD AUTO: 0.9 %
ERYTHROCYTE [DISTWIDTH] IN BLOOD BY AUTOMATED COUNT: 15.1 %
GFR SERPLBLD BASED ON 1.73 SQ M-ARVRAT: 88 ML/MIN/1.73M2 (ref 60–?)
GLUCOSE BLD-MCNC: 93 MG/DL (ref 70–99)
HCT VFR BLD AUTO: 37.8 %
HGB BLD-MCNC: 12 G/DL
IMM GRANULOCYTES # BLD AUTO: 0.01 X10(3) UL (ref 0–1)
IMM GRANULOCYTES NFR BLD: 0.2 %
LYMPHOCYTES # BLD AUTO: 0.88 X10(3) UL (ref 1–4)
LYMPHOCYTES NFR BLD AUTO: 20.2 %
MCH RBC QN AUTO: 29.3 PG (ref 26–34)
MCHC RBC AUTO-ENTMCNC: 31.7 G/DL (ref 31–37)
MCV RBC AUTO: 92.4 FL
MONOCYTES # BLD AUTO: 0.5 X10(3) UL (ref 0.1–1)
MONOCYTES NFR BLD AUTO: 11.5 %
NEUTROPHILS # BLD AUTO: 2.91 X10 (3) UL (ref 1.5–7.7)
NEUTROPHILS # BLD AUTO: 2.91 X10(3) UL (ref 1.5–7.7)
NEUTROPHILS NFR BLD AUTO: 66.7 %
OSMOLALITY SERPL CALC.SUM OF ELEC: 289 MOSM/KG (ref 275–295)
PLATELET # BLD AUTO: 238 10(3)UL (ref 150–450)
POTASSIUM SERPL-SCNC: 3.9 MMOL/L (ref 3.5–5.1)
RBC # BLD AUTO: 4.09 X10(6)UL
SODIUM SERPL-SCNC: 139 MMOL/L (ref 136–145)
WBC # BLD AUTO: 4.4 X10(3) UL (ref 4–11)

## 2023-01-08 PROCEDURE — 80048 BASIC METABOLIC PNL TOTAL CA: CPT | Performed by: ORTHOPAEDIC SURGERY

## 2023-01-08 PROCEDURE — 85025 COMPLETE CBC W/AUTO DIFF WBC: CPT | Performed by: ORTHOPAEDIC SURGERY

## 2023-01-08 RX ORDER — DIPHENHYDRAMINE HCL 25 MG
25 CAPSULE ORAL EVERY 6 HOURS PRN
Status: DISCONTINUED | OUTPATIENT
Start: 2023-01-08 | End: 2023-01-09

## 2023-01-08 NOTE — PLAN OF CARE
POD 1 Rt hand 4th digit I&D, Pt is AAOX4, VSS, room air, voiding to restroom, up MIN w/ cane, IV SL w/ IV ABX, will need PICC, dressing changed by Ortho today, awaiting final ABX orders, minimal pain noted, Pt doing well, all needs met, all safety measures in place, call light within reach, will CTM.

## 2023-01-08 NOTE — PLAN OF CARE
A&O x 4, VSS, Stable on RA,   4/10 pain to left 4th digit  Dressing to left digit C/D/I  IV abxs given  Heparin given  Regular diet  Standby with cane     Plan- PICC line placement on Monday.  Discharge when medically cleared

## 2023-01-09 ENCOUNTER — DOCUMENTATION ONLY (OUTPATIENT)
Dept: HEMATOLOGY/ONCOLOGY | Facility: HOSPITAL | Age: 88
End: 2023-01-09

## 2023-01-09 VITALS
WEIGHT: 164 LBS | HEART RATE: 79 BPM | RESPIRATION RATE: 18 BRPM | HEIGHT: 64.02 IN | SYSTOLIC BLOOD PRESSURE: 139 MMHG | DIASTOLIC BLOOD PRESSURE: 50 MMHG | OXYGEN SATURATION: 96 % | TEMPERATURE: 98 F | BODY MASS INDEX: 28 KG/M2

## 2023-01-09 LAB
ANION GAP SERPL CALC-SCNC: 4 MMOL/L (ref 0–18)
BASOPHILS # BLD AUTO: 0.02 X10(3) UL (ref 0–0.2)
BASOPHILS NFR BLD AUTO: 0.5 %
BUN BLD-MCNC: 16 MG/DL (ref 7–18)
CALCIUM BLD-MCNC: 9.3 MG/DL (ref 8.5–10.1)
CHLORIDE SERPL-SCNC: 106 MMOL/L (ref 98–112)
CO2 SERPL-SCNC: 28 MMOL/L (ref 21–32)
CREAT BLD-MCNC: 0.58 MG/DL
EOSINOPHIL # BLD AUTO: 0.07 X10(3) UL (ref 0–0.7)
EOSINOPHIL NFR BLD AUTO: 1.7 %
ERYTHROCYTE [DISTWIDTH] IN BLOOD BY AUTOMATED COUNT: 15.2 %
GFR SERPLBLD BASED ON 1.73 SQ M-ARVRAT: 88 ML/MIN/1.73M2 (ref 60–?)
GLUCOSE BLD-MCNC: 113 MG/DL (ref 70–99)
HCT VFR BLD AUTO: 39.1 %
HGB BLD-MCNC: 12.6 G/DL
IMM GRANULOCYTES # BLD AUTO: 0.02 X10(3) UL (ref 0–1)
IMM GRANULOCYTES NFR BLD: 0.5 %
LYMPHOCYTES # BLD AUTO: 1.13 X10(3) UL (ref 1–4)
LYMPHOCYTES NFR BLD AUTO: 27.2 %
MCH RBC QN AUTO: 29.8 PG (ref 26–34)
MCHC RBC AUTO-ENTMCNC: 32.2 G/DL (ref 31–37)
MCV RBC AUTO: 92.4 FL
MONOCYTES # BLD AUTO: 0.47 X10(3) UL (ref 0.1–1)
MONOCYTES NFR BLD AUTO: 11.3 %
NEUTROPHILS # BLD AUTO: 2.44 X10 (3) UL (ref 1.5–7.7)
NEUTROPHILS # BLD AUTO: 2.44 X10(3) UL (ref 1.5–7.7)
NEUTROPHILS NFR BLD AUTO: 58.8 %
OSMOLALITY SERPL CALC.SUM OF ELEC: 288 MOSM/KG (ref 275–295)
PLATELET # BLD AUTO: 229 10(3)UL (ref 150–450)
POTASSIUM SERPL-SCNC: 4.1 MMOL/L (ref 3.5–5.1)
RBC # BLD AUTO: 4.23 X10(6)UL
SODIUM SERPL-SCNC: 138 MMOL/L (ref 136–145)
WBC # BLD AUTO: 4.2 X10(3) UL (ref 4–11)

## 2023-01-09 PROCEDURE — B548ZZA ULTRASONOGRAPHY OF SUPERIOR VENA CAVA, GUIDANCE: ICD-10-PCS | Performed by: INTERNAL MEDICINE

## 2023-01-09 PROCEDURE — 80048 BASIC METABOLIC PNL TOTAL CA: CPT | Performed by: ORTHOPAEDIC SURGERY

## 2023-01-09 PROCEDURE — 36569 INSJ PICC 5 YR+ W/O IMAGING: CPT

## 2023-01-09 PROCEDURE — 02HV33Z INSERTION OF INFUSION DEVICE INTO SUPERIOR VENA CAVA, PERCUTANEOUS APPROACH: ICD-10-PCS | Performed by: INTERNAL MEDICINE

## 2023-01-09 PROCEDURE — 85025 COMPLETE CBC W/AUTO DIFF WBC: CPT | Performed by: ORTHOPAEDIC SURGERY

## 2023-01-09 RX ORDER — HYDROCHLOROTHIAZIDE 25 MG/1
25 TABLET ORAL DAILY
Status: DISCONTINUED | OUTPATIENT
Start: 2023-01-09 | End: 2023-01-09

## 2023-01-09 RX ORDER — LIDOCAINE HYDROCHLORIDE 10 MG/ML
5 INJECTION, SOLUTION EPIDURAL; INFILTRATION; INTRACAUDAL; PERINEURAL
Status: COMPLETED | OUTPATIENT
Start: 2023-01-09 | End: 2023-01-09

## 2023-01-09 RX ORDER — ENALAPRIL MALEATE 10 MG/1
10 TABLET ORAL DAILY
Status: DISCONTINUED | OUTPATIENT
Start: 2023-01-09 | End: 2023-01-09

## 2023-01-09 NOTE — DISCHARGE INSTRUCTIONS
Please call [facility]. Fax: [phone]. Tel: [phone] to schedule an appoint with infectious disease in 2 weeks.

## 2023-01-09 NOTE — PLAN OF CARE
Patient A & O x4. VSS, on RA. Denies any pain. Incision to finger cover with gauze/coban is clean, dry and intact. IV abx. Voiding freely. Up standby assist with cane. Safety measures in place. Instructed to use call light.

## 2023-01-09 NOTE — CM/SW NOTE
Department  notified of request for [facility] and Infusion , aidin referrals started. Assigned CM/SW to follow up with pt/family on further discharge planning.      Zainab MULLIGAN [facility]

## 2023-01-09 NOTE — CM/SW NOTE
01/09/23 0800   CM/SW Referral Data   Referral Source Social Work (self-referral)   Reason for Referral Discharge planning   Informant EMR;Clinical Staff Member   Patient Info   Patient's Current Mental Status at Time of Assessment Alert;Oriented   Patient lives with Son   Patient Status Prior to Admission   Independent with ADLs and Mobility Yes   Discharge Needs   Anticipated D/C needs Home health care; Infusion care       Spoke with pt's RN regarding DC planning. Pt is POD 2 of hand I&D, anticipated to need IV abx at DC. PICC placement today. Final orders pending. Message sent to [facility] to request [facility] and infusion referrals. / to remain available for support and/or discharge planning.      Nicanor Burton [facility]  Discharge Planner  [phone]

## 2023-01-09 NOTE — PLAN OF CARE
POD 2 Rt hand 4th digit I&D, Pt is AAOX4, VSS, room air, voiding to restroom, up MIN w/ cane, IV SL w/ IV ABX, PICC line in place, dressing changed by [facility] [address] Sondra House RN, awaiting final ABX orders, no pain noted, Pt doing well, all needs met, all safety measures in place, call light within reach, will CTM.

## 2023-01-09 NOTE — PLAN OF CARE
NURSING DISCHARGE NOTE    Discharged Home via Wheelchair. Accompanied by Family member  Belongings Taken by patient/family. AVS printed and discussed, IV removed, PICC line covered with mesh gauze. Pt set with appointment for ABX infusion at cancer center tomorrow. Pt ready to discharge home.

## 2023-01-09 NOTE — CM/SW NOTE
Contacted Dr Jose Iqbal who confirmed plan for DC with rocephin 2g daily. Spoke with Kassie Knowles from [facility] who scheduled pt's first appointment for tomorrow 1/10 at 1pm.  They will scheduled all following appointments at pt's first visit. Updated pt's RN. Pt to receive first dose of rocephin prior to DC. No further needs at this time. / to remain available for support and/or discharge planning.      Kaye Baez [facility]  Discharge Planner  [phone]

## 2023-01-09 NOTE — PROGRESS NOTES
Received call from [place] at Ortho unit. Pt anticipated discharge tomorrow. Therapy plan entered. PICC line placed. Verified Medicare insurance and pt appropriate for outpatient setting. Billing, pharm aware.  Appt requested and CM aware this will be in AVS.

## 2023-01-09 NOTE — HOME CARE LIAISON
Received referral via [facility] for Home Health services. Spoke w/ patient and provided with list of [facility] providers from [address] [place] [address] Road, choice is [facility]. Agency reserved in [address] [place] [address] Road and contact information placed on AVS.Financial interest disclosure provided. Notified plan is for patient to do OP IV ABX infusion. [facility] remains available if plans change and services are needed.  Notified Mir Gay

## 2023-01-09 NOTE — CM/SW NOTE
Met with pt and spoke with pt's sonElliott Zelalem by speaker phone while in room regarding DC planning for IV abx. Reviewed options for infusion/IV abx: 1) nursing home, 2) home with home health care 3) home with in office teach and train vs 4) outpt infusion at MD office or infusion center. Discussed that home infusion may include out of pocket costs and/or require patient to be homebound and also that infusion center can only accommodate once daily infusion. Pt/son are concerned about potential costs for home infusion. Pt's son prefers outpatient infusion at [facility] if once daily option is available. Otherwise he would assist pt with IV abx at home, but they would like to know out of pocket costs. Referrals pending for [facility] able to accept) and infusion. Updated pt's RN. Await MD recommendations for antibiotics needed at DC. / to remain available for support and/or discharge planning.      [facility] Bliss [facility]  Dis[facility] Planner  [phone]

## 2023-01-10 ENCOUNTER — NURSE ONLY (OUTPATIENT)
Dept: HEMATOLOGY/ONCOLOGY | Facility: HOSPITAL | Age: 88
End: 2023-01-10
Attending: INTERNAL MEDICINE
Payer: MEDICARE

## 2023-01-10 VITALS
RESPIRATION RATE: 18 BRPM | OXYGEN SATURATION: 93 % | HEART RATE: 91 BPM | SYSTOLIC BLOOD PRESSURE: 138 MMHG | TEMPERATURE: 97 F | DIASTOLIC BLOOD PRESSURE: 73 MMHG

## 2023-01-10 DIAGNOSIS — M86.9 OSTEOMYELITIS OF RIGHT HAND, UNSPECIFIED TYPE (HCC): Primary | ICD-10-CM

## 2023-01-10 PROCEDURE — 96365 THER/PROPH/DIAG IV INF INIT: CPT

## 2023-01-10 NOTE — PROGRESS NOTES
Education Record    Learner:  Patient    Disease / Diagnosis: osteo    Barriers / Limitations:  None    Method:  Brief focused, printed material and  reinforcement    General Topics:  Plan of care reviewed    Outcome:  Shows understanding    Here for rocephin. Discharged yesterday. Per notes yesterday on insurance, auth approved. Appts made through 2/18 per order. Labs on Mondays.

## 2023-01-11 ENCOUNTER — NURSE ONLY (OUTPATIENT)
Dept: HEMATOLOGY/ONCOLOGY | Facility: HOSPITAL | Age: 88
End: 2023-01-11
Attending: INTERNAL MEDICINE
Payer: MEDICARE

## 2023-01-11 VITALS
DIASTOLIC BLOOD PRESSURE: 76 MMHG | RESPIRATION RATE: 18 BRPM | OXYGEN SATURATION: 93 % | TEMPERATURE: 98 F | SYSTOLIC BLOOD PRESSURE: 161 MMHG | HEART RATE: 89 BPM

## 2023-01-11 DIAGNOSIS — M86.9 OSTEOMYELITIS OF RIGHT HAND, UNSPECIFIED TYPE (HCC): Primary | ICD-10-CM

## 2023-01-11 PROCEDURE — 96365 THER/PROPH/DIAG IV INF INIT: CPT

## 2023-01-11 NOTE — PROGRESS NOTES
Education Record    Learner:  Patient and Family Member    Disease / Diagnosis: osteomyelitis    Barriers / Limitations:  None   Comments:    Method:  Discussion   Comments:    General Topics:  Medication and Plan of care reviewed   Comments:    Outcome:  Shows understanding   Comments:    Pt here for IV rocephin infusion. Tolerated well. Discharged in stable condition.

## 2023-01-12 ENCOUNTER — NURSE ONLY (OUTPATIENT)
Dept: HEMATOLOGY/ONCOLOGY | Facility: HOSPITAL | Age: 88
End: 2023-01-12
Attending: INTERNAL MEDICINE
Payer: MEDICARE

## 2023-01-12 VITALS
SYSTOLIC BLOOD PRESSURE: 145 MMHG | TEMPERATURE: 98 F | HEART RATE: 95 BPM | RESPIRATION RATE: 18 BRPM | DIASTOLIC BLOOD PRESSURE: 72 MMHG | OXYGEN SATURATION: 94 %

## 2023-01-12 DIAGNOSIS — M86.9 OSTEOMYELITIS OF RIGHT HAND, UNSPECIFIED TYPE (HCC): Primary | ICD-10-CM

## 2023-01-12 PROCEDURE — 96365 THER/PROPH/DIAG IV INF INIT: CPT

## 2023-01-12 NOTE — PROGRESS NOTES
Education Record    Learner:  Patient    Disease / Diagnosis: Afshan    Barriers / Limitations:  None   Comments:    Method:  Brief focused and Reinforcement   Comments:    General Topics:  Diet, Medication, Side effects and symptom management and Plan of care reviewed   Comments:    Outcome:  Shows understanding   Comments: Patient tolerated infusion and discharged in stable condition.

## 2023-01-13 ENCOUNTER — NURSE ONLY (OUTPATIENT)
Dept: HEMATOLOGY/ONCOLOGY | Facility: HOSPITAL | Age: 88
End: 2023-01-13
Attending: INTERNAL MEDICINE
Payer: MEDICARE

## 2023-01-13 ENCOUNTER — TELEPHONE (OUTPATIENT)
Dept: ORTHOPEDICS CLINIC | Facility: CLINIC | Age: 88
End: 2023-01-13

## 2023-01-13 VITALS
DIASTOLIC BLOOD PRESSURE: 75 MMHG | TEMPERATURE: 99 F | SYSTOLIC BLOOD PRESSURE: 130 MMHG | RESPIRATION RATE: 20 BRPM | OXYGEN SATURATION: 95 % | HEART RATE: 84 BPM

## 2023-01-13 DIAGNOSIS — M86.9 OSTEOMYELITIS OF RIGHT HAND, UNSPECIFIED TYPE (HCC): Primary | ICD-10-CM

## 2023-01-13 PROCEDURE — 96365 THER/PROPH/DIAG IV INF INIT: CPT

## 2023-01-13 NOTE — TELEPHONE ENCOUNTER
LMOM advising appointment to be cancelled per Sharon Tamez and Dr. Odessia Burkitt. Advised pt needs to see Dr. Gena Chapa.

## 2023-01-13 NOTE — TELEPHONE ENCOUNTER
Per Lisa Cha this patient should be removed from [facility] 1/16 schedule and be told to follow up with her surgeon, Dr. Valerie Robles.

## 2023-01-13 NOTE — PROGRESS NOTES
Education Record    Learner:  Patient and Family Member    Disease / Diagnosis: Afshan    Barriers / Limitations:  None   Comments:    Method:  Discussion   Comments:    General Topics:  Plan of care reviewed   Comments:    Outcome:  Shows understanding   Comments:

## 2023-01-14 ENCOUNTER — NURSE ONLY (OUTPATIENT)
Dept: HEMATOLOGY/ONCOLOGY | Facility: HOSPITAL | Age: 88
End: 2023-01-14
Attending: INTERNAL MEDICINE
Payer: MEDICARE

## 2023-01-14 VITALS
HEART RATE: 78 BPM | RESPIRATION RATE: 18 BRPM | TEMPERATURE: 98 F | SYSTOLIC BLOOD PRESSURE: 127 MMHG | OXYGEN SATURATION: 96 % | DIASTOLIC BLOOD PRESSURE: 76 MMHG

## 2023-01-14 DIAGNOSIS — M86.9 OSTEOMYELITIS OF RIGHT HAND, UNSPECIFIED TYPE (HCC): Primary | ICD-10-CM

## 2023-01-14 PROCEDURE — 96365 THER/PROPH/DIAG IV INF INIT: CPT

## 2023-01-14 NOTE — PROGRESS NOTES
Education Record    Learner:  Patient and Family Member    Disease / Diagnosis: Osteomyelitis    Barriers / Limitations:  None   Comments:    Method:  Brief focused   Comments:    General Topics:  Plan of care reviewed   Comments:    Outcome:  Shows understanding   Comments:

## 2023-01-15 ENCOUNTER — NURSE ONLY (OUTPATIENT)
Dept: HEMATOLOGY/ONCOLOGY | Facility: HOSPITAL | Age: 88
End: 2023-01-15
Attending: INTERNAL MEDICINE
Payer: MEDICARE

## 2023-01-15 VITALS
SYSTOLIC BLOOD PRESSURE: 144 MMHG | HEART RATE: 78 BPM | RESPIRATION RATE: 18 BRPM | DIASTOLIC BLOOD PRESSURE: 57 MMHG | TEMPERATURE: 98 F | OXYGEN SATURATION: 96 %

## 2023-01-15 DIAGNOSIS — M86.9 OSTEOMYELITIS OF RIGHT HAND, UNSPECIFIED TYPE (HCC): Primary | ICD-10-CM

## 2023-01-15 PROCEDURE — 96365 THER/PROPH/DIAG IV INF INIT: CPT

## 2023-01-15 NOTE — PROGRESS NOTES
Education Record    Learner:  Patient    Disease / Diagnosis: here for rocephin    Barriers / Limitations:  None    Method:  Brief focused, printed material and  reinforcement    General Topics:  Plan of care reviewed    Outcome: patient ambulatory. Arrived with family member. Tolerated infusion. Discharged in stable condition.  Shows understanding

## 2023-01-16 ENCOUNTER — NURSE ONLY (OUTPATIENT)
Dept: HEMATOLOGY/ONCOLOGY | Facility: HOSPITAL | Age: 88
End: 2023-01-16
Attending: INTERNAL MEDICINE
Payer: MEDICARE

## 2023-01-16 VITALS
DIASTOLIC BLOOD PRESSURE: 75 MMHG | HEART RATE: 88 BPM | OXYGEN SATURATION: 94 % | RESPIRATION RATE: 16 BRPM | TEMPERATURE: 98 F | SYSTOLIC BLOOD PRESSURE: 145 MMHG

## 2023-01-16 DIAGNOSIS — M86.9 OSTEOMYELITIS OF RIGHT HAND, UNSPECIFIED TYPE (HCC): Primary | ICD-10-CM

## 2023-01-16 LAB
ALBUMIN SERPL-MCNC: 3.4 G/DL (ref 3.4–5)
ALBUMIN/GLOB SERPL: 1 {RATIO} (ref 1–2)
ALP LIVER SERPL-CCNC: 64 U/L
ALT SERPL-CCNC: 28 U/L
ANION GAP SERPL CALC-SCNC: 4 MMOL/L (ref 0–18)
AST SERPL-CCNC: 18 U/L (ref 15–37)
BASOPHILS # BLD AUTO: 0.03 X10(3) UL (ref 0–0.2)
BASOPHILS NFR BLD AUTO: 0.6 %
BILIRUB SERPL-MCNC: 0.5 MG/DL (ref 0.1–2)
BUN BLD-MCNC: 12 MG/DL (ref 7–18)
CALCIUM BLD-MCNC: 9.6 MG/DL (ref 8.5–10.1)
CHLORIDE SERPL-SCNC: 102 MMOL/L (ref 98–112)
CO2 SERPL-SCNC: 30 MMOL/L (ref 21–32)
CREAT BLD-MCNC: 0.63 MG/DL
EOSINOPHIL # BLD AUTO: 0.05 X10(3) UL (ref 0–0.7)
EOSINOPHIL NFR BLD AUTO: 1 %
ERYTHROCYTE [DISTWIDTH] IN BLOOD BY AUTOMATED COUNT: 15.3 %
GFR SERPLBLD BASED ON 1.73 SQ M-ARVRAT: 86 ML/MIN/1.73M2 (ref 60–?)
GLOBULIN PLAS-MCNC: 3.3 G/DL (ref 2.8–4.4)
GLUCOSE BLD-MCNC: 104 MG/DL (ref 70–99)
HCT VFR BLD AUTO: 38.9 %
HGB BLD-MCNC: 12.5 G/DL
IMM GRANULOCYTES # BLD AUTO: 0.02 X10(3) UL (ref 0–1)
IMM GRANULOCYTES NFR BLD: 0.4 %
LYMPHOCYTES # BLD AUTO: 0.85 X10(3) UL (ref 1–4)
LYMPHOCYTES NFR BLD AUTO: 17.7 %
MCH RBC QN AUTO: 29.5 PG (ref 26–34)
MCHC RBC AUTO-ENTMCNC: 32.1 G/DL (ref 31–37)
MCV RBC AUTO: 91.7 FL
MONOCYTES # BLD AUTO: 0.46 X10(3) UL (ref 0.1–1)
MONOCYTES NFR BLD AUTO: 9.6 %
NEUTROPHILS # BLD AUTO: 3.4 X10 (3) UL (ref 1.5–7.7)
NEUTROPHILS # BLD AUTO: 3.4 X10(3) UL (ref 1.5–7.7)
NEUTROPHILS NFR BLD AUTO: 70.7 %
OSMOLALITY SERPL CALC.SUM OF ELEC: 282 MOSM/KG (ref 275–295)
PLATELET # BLD AUTO: 240 10(3)UL (ref 150–450)
POTASSIUM SERPL-SCNC: 3.6 MMOL/L (ref 3.5–5.1)
PROT SERPL-MCNC: 6.7 G/DL (ref 6.4–8.2)
RBC # BLD AUTO: 4.24 X10(6)UL
SODIUM SERPL-SCNC: 136 MMOL/L (ref 136–145)
WBC # BLD AUTO: 4.8 X10(3) UL (ref 4–11)

## 2023-01-16 PROCEDURE — 96365 THER/PROPH/DIAG IV INF INIT: CPT

## 2023-01-16 PROCEDURE — 85025 COMPLETE CBC W/AUTO DIFF WBC: CPT

## 2023-01-16 PROCEDURE — 80053 COMPREHEN METABOLIC PANEL: CPT

## 2023-01-16 NOTE — PROGRESS NOTES
Pt here for daily ceftriaxone for Osteomyelitis. Denies any issues or concerns. Tolerated infusion well and without difficulty. EOT: 2/18/23  Pt states she is scheduled to follow up with surgeon next week.      Education Record    Learner:  Patient, family member (son)    Disease / Diagnosis: osteomyelitis     Barriers / Limitations:  None   Comments:    Method:  Discussion   Comments:    General Topics:  Plan of care reviewed   Comments:    Outcome:  Shows understanding   Comments:

## 2023-01-17 ENCOUNTER — NURSE ONLY (OUTPATIENT)
Dept: HEMATOLOGY/ONCOLOGY | Facility: HOSPITAL | Age: 88
End: 2023-01-17
Attending: INTERNAL MEDICINE
Payer: MEDICARE

## 2023-01-17 VITALS
SYSTOLIC BLOOD PRESSURE: 147 MMHG | BODY MASS INDEX: 28 KG/M2 | WEIGHT: 162.19 LBS | TEMPERATURE: 98 F | OXYGEN SATURATION: 95 % | RESPIRATION RATE: 18 BRPM | HEART RATE: 81 BPM | DIASTOLIC BLOOD PRESSURE: 68 MMHG

## 2023-01-17 DIAGNOSIS — M86.9 OSTEOMYELITIS OF RIGHT HAND, UNSPECIFIED TYPE (HCC): Primary | ICD-10-CM

## 2023-01-17 PROCEDURE — 96365 THER/PROPH/DIAG IV INF INIT: CPT

## 2023-01-17 NOTE — PROGRESS NOTES
Education Record    Learner:  Patient    Disease / Diagnosis: Here for IV antibiotics     Barriers / Limitations:  None    Method:  Brief focused, printed material and  reinforcement    General Topics:  Plan of care reviewed    Outcome:  Pt Left picc line, left limb precautions. Tolerated treatment without incident. Accompanied by her son. PICC flushed and capped. Left in stable condition. Appts in place for remainder of her treatment.

## 2023-01-18 ENCOUNTER — NURSE ONLY (OUTPATIENT)
Dept: HEMATOLOGY/ONCOLOGY | Facility: HOSPITAL | Age: 88
End: 2023-01-18
Attending: INTERNAL MEDICINE
Payer: MEDICARE

## 2023-01-18 VITALS
OXYGEN SATURATION: 95 % | DIASTOLIC BLOOD PRESSURE: 79 MMHG | RESPIRATION RATE: 20 BRPM | SYSTOLIC BLOOD PRESSURE: 138 MMHG | TEMPERATURE: 98 F | HEART RATE: 83 BPM

## 2023-01-18 DIAGNOSIS — M86.9 OSTEOMYELITIS OF RIGHT HAND, UNSPECIFIED TYPE (HCC): Primary | ICD-10-CM

## 2023-01-18 PROCEDURE — 96365 THER/PROPH/DIAG IV INF INIT: CPT

## 2023-01-19 ENCOUNTER — NURSE ONLY (OUTPATIENT)
Dept: HEMATOLOGY/ONCOLOGY | Facility: HOSPITAL | Age: 88
End: 2023-01-19
Attending: INTERNAL MEDICINE
Payer: MEDICARE

## 2023-01-19 VITALS
RESPIRATION RATE: 18 BRPM | DIASTOLIC BLOOD PRESSURE: 80 MMHG | SYSTOLIC BLOOD PRESSURE: 132 MMHG | OXYGEN SATURATION: 95 % | HEART RATE: 81 BPM | TEMPERATURE: 98 F

## 2023-01-19 DIAGNOSIS — M86.9 OSTEOMYELITIS OF RIGHT HAND, UNSPECIFIED TYPE (HCC): Primary | ICD-10-CM

## 2023-01-19 PROCEDURE — 96365 THER/PROPH/DIAG IV INF INIT: CPT

## 2023-01-19 NOTE — PROGRESS NOTES
Education Record    Learner:  Patient    Disease / Diagnosis: osteomyelitis    Barriers / Limitations:  None    Method:  Brief focused, printed material and  reinforcement    General Topics:  Plan of care reviewed    Outcome:  Shows understanding    Here for rocephin. No complaints.

## 2023-01-20 ENCOUNTER — NURSE ONLY (OUTPATIENT)
Dept: HEMATOLOGY/ONCOLOGY | Facility: HOSPITAL | Age: 88
End: 2023-01-20
Attending: INTERNAL MEDICINE
Payer: MEDICARE

## 2023-01-20 VITALS
OXYGEN SATURATION: 96 % | SYSTOLIC BLOOD PRESSURE: 131 MMHG | DIASTOLIC BLOOD PRESSURE: 74 MMHG | RESPIRATION RATE: 18 BRPM | HEART RATE: 83 BPM | TEMPERATURE: 98 F

## 2023-01-20 DIAGNOSIS — M86.9 OSTEOMYELITIS OF RIGHT HAND, UNSPECIFIED TYPE (HCC): Primary | ICD-10-CM

## 2023-01-20 PROCEDURE — 96365 THER/PROPH/DIAG IV INF INIT: CPT

## 2023-01-20 NOTE — PROGRESS NOTES
Education Record    Learner:  Patient and Family Member    Disease / Diagnosis: Osteomyelitis    Barriers / Limitations:  None   Comments:    Method:  Brief focused and Reinforcement   Comments:    General Topics:  Plan of care reviewed and Fall risk and prevention   Comments:    Outcome:  Shows understanding   Comments:

## 2023-01-21 ENCOUNTER — NURSE ONLY (OUTPATIENT)
Dept: HEMATOLOGY/ONCOLOGY | Facility: HOSPITAL | Age: 88
End: 2023-01-21
Attending: INTERNAL MEDICINE
Payer: MEDICARE

## 2023-01-21 VITALS
DIASTOLIC BLOOD PRESSURE: 73 MMHG | TEMPERATURE: 98 F | HEART RATE: 62 BPM | SYSTOLIC BLOOD PRESSURE: 133 MMHG | RESPIRATION RATE: 18 BRPM | OXYGEN SATURATION: 97 %

## 2023-01-21 DIAGNOSIS — M86.9 OSTEOMYELITIS OF RIGHT HAND, UNSPECIFIED TYPE (HCC): Primary | ICD-10-CM

## 2023-01-21 PROCEDURE — 96365 THER/PROPH/DIAG IV INF INIT: CPT

## 2023-01-21 NOTE — PROGRESS NOTES
Education Record    Learner:  Patient and Family Member    Disease / Diagnosis: IV Abx    Barriers / Limitations:  None   Comments:    Method:  Discussion   Comments:    General Topics:  Plan of care reviewed   Comments:    Outcome:  Shows understanding   Comments:

## 2023-01-22 ENCOUNTER — NURSE ONLY (OUTPATIENT)
Dept: HEMATOLOGY/ONCOLOGY | Facility: HOSPITAL | Age: 88
End: 2023-01-22
Attending: INTERNAL MEDICINE
Payer: MEDICARE

## 2023-01-22 VITALS
RESPIRATION RATE: 16 BRPM | DIASTOLIC BLOOD PRESSURE: 69 MMHG | OXYGEN SATURATION: 95 % | SYSTOLIC BLOOD PRESSURE: 154 MMHG | HEART RATE: 82 BPM | TEMPERATURE: 98 F

## 2023-01-22 DIAGNOSIS — M86.9 OSTEOMYELITIS OF RIGHT HAND, UNSPECIFIED TYPE (HCC): Primary | ICD-10-CM

## 2023-01-22 PROCEDURE — 96365 THER/PROPH/DIAG IV INF INIT: CPT

## 2023-01-22 NOTE — PROGRESS NOTES
Education Record    Learner:  Patient and Family Member    Disease / Diagnosis: infection    Barriers / Limitations:  None   Comments:    Method:  Reinforcement   Comments:    General Topics:  Medication and Plan of care reviewed   Comments:    Outcome:  Shows understanding   Comments:    Rocephin 2 grams given.

## 2023-01-23 ENCOUNTER — NURSE ONLY (OUTPATIENT)
Dept: HEMATOLOGY/ONCOLOGY | Facility: HOSPITAL | Age: 88
End: 2023-01-23
Attending: INTERNAL MEDICINE
Payer: MEDICARE

## 2023-01-23 VITALS
SYSTOLIC BLOOD PRESSURE: 155 MMHG | HEART RATE: 88 BPM | RESPIRATION RATE: 16 BRPM | TEMPERATURE: 97 F | OXYGEN SATURATION: 95 % | DIASTOLIC BLOOD PRESSURE: 75 MMHG

## 2023-01-23 DIAGNOSIS — M86.9 OSTEOMYELITIS OF RIGHT HAND, UNSPECIFIED TYPE (HCC): Primary | ICD-10-CM

## 2023-01-23 LAB
ALBUMIN SERPL-MCNC: 3.5 G/DL (ref 3.4–5)
ALBUMIN/GLOB SERPL: 1.1 {RATIO} (ref 1–2)
ALP LIVER SERPL-CCNC: 68 U/L
ALT SERPL-CCNC: 25 U/L
ANION GAP SERPL CALC-SCNC: 3 MMOL/L (ref 0–18)
AST SERPL-CCNC: 20 U/L (ref 15–37)
BASOPHILS # BLD AUTO: 0.06 X10(3) UL (ref 0–0.2)
BASOPHILS NFR BLD AUTO: 1.1 %
BILIRUB SERPL-MCNC: 0.5 MG/DL (ref 0.1–2)
BUN BLD-MCNC: 10 MG/DL (ref 7–18)
CALCIUM BLD-MCNC: 9.5 MG/DL (ref 8.5–10.1)
CHLORIDE SERPL-SCNC: 102 MMOL/L (ref 98–112)
CO2 SERPL-SCNC: 30 MMOL/L (ref 21–32)
CREAT BLD-MCNC: 0.64 MG/DL
EOSINOPHIL # BLD AUTO: 0.04 X10(3) UL (ref 0–0.7)
EOSINOPHIL NFR BLD AUTO: 0.7 %
ERYTHROCYTE [DISTWIDTH] IN BLOOD BY AUTOMATED COUNT: 15.2 %
FASTING STATUS PATIENT QL REPORTED: NO
GFR SERPLBLD BASED ON 1.73 SQ M-ARVRAT: 85 ML/MIN/1.73M2 (ref 60–?)
GLOBULIN PLAS-MCNC: 3.2 G/DL (ref 2.8–4.4)
GLUCOSE BLD-MCNC: 109 MG/DL (ref 70–99)
HCT VFR BLD AUTO: 40.1 %
HGB BLD-MCNC: 12.9 G/DL
IMM GRANULOCYTES # BLD AUTO: 0.02 X10(3) UL (ref 0–1)
IMM GRANULOCYTES NFR BLD: 0.4 %
LYMPHOCYTES # BLD AUTO: 0.98 X10(3) UL (ref 1–4)
LYMPHOCYTES NFR BLD AUTO: 17.2 %
MCH RBC QN AUTO: 29.3 PG (ref 26–34)
MCHC RBC AUTO-ENTMCNC: 32.2 G/DL (ref 31–37)
MCV RBC AUTO: 91.1 FL
MONOCYTES # BLD AUTO: 0.56 X10(3) UL (ref 0.1–1)
MONOCYTES NFR BLD AUTO: 9.8 %
NEUTROPHILS # BLD AUTO: 4.05 X10 (3) UL (ref 1.5–7.7)
NEUTROPHILS # BLD AUTO: 4.05 X10(3) UL (ref 1.5–7.7)
NEUTROPHILS NFR BLD AUTO: 70.8 %
OSMOLALITY SERPL CALC.SUM OF ELEC: 280 MOSM/KG (ref 275–295)
PLATELET # BLD AUTO: 284 10(3)UL (ref 150–450)
POTASSIUM SERPL-SCNC: 3.8 MMOL/L (ref 3.5–5.1)
PROT SERPL-MCNC: 6.7 G/DL (ref 6.4–8.2)
RBC # BLD AUTO: 4.4 X10(6)UL
SODIUM SERPL-SCNC: 135 MMOL/L (ref 136–145)
WBC # BLD AUTO: 5.7 X10(3) UL (ref 4–11)

## 2023-01-23 PROCEDURE — 80053 COMPREHEN METABOLIC PANEL: CPT

## 2023-01-23 PROCEDURE — 96365 THER/PROPH/DIAG IV INF INIT: CPT

## 2023-01-23 PROCEDURE — 85025 COMPLETE CBC W/AUTO DIFF WBC: CPT

## 2023-01-23 NOTE — PROGRESS NOTES
Education Record    Learner:  Patient    Disease / Diagnosis: osteomyelitis    Barriers / Limitations:  None    Method:  Brief focused, printed material and  reinforcement    General Topics:  Plan of care reviewed    Outcome:  Shows understanding    Here for rocephin. No complaints. Labs drawn per order, dressing changed per schedule.

## 2023-01-24 ENCOUNTER — NURSE ONLY (OUTPATIENT)
Dept: HEMATOLOGY/ONCOLOGY | Facility: HOSPITAL | Age: 88
End: 2023-01-24
Attending: INTERNAL MEDICINE
Payer: MEDICARE

## 2023-01-24 VITALS
BODY MASS INDEX: 28 KG/M2 | SYSTOLIC BLOOD PRESSURE: 148 MMHG | WEIGHT: 163.5 LBS | DIASTOLIC BLOOD PRESSURE: 68 MMHG | TEMPERATURE: 98 F | OXYGEN SATURATION: 95 % | HEART RATE: 70 BPM | RESPIRATION RATE: 18 BRPM

## 2023-01-24 DIAGNOSIS — M86.9 OSTEOMYELITIS OF RIGHT HAND, UNSPECIFIED TYPE (HCC): Primary | ICD-10-CM

## 2023-01-24 PROCEDURE — 96374 THER/PROPH/DIAG INJ IV PUSH: CPT

## 2023-01-24 NOTE — PROGRESS NOTES
Per Dr. Alex Rosales - change antibiotic to Daptomycin 500mg (ADW 62.5kg, dose 8mg/kg) daily x 6 weeks. Labs weekly - CBC, CMP, ESR, CRP, CPK and picc care. Change in order and labs dicussed with MD, requested paper order faxed to office.

## 2023-01-24 NOTE — PROGRESS NOTES
Education Record    Learner:  Patient    Disease / Diagnosis: Here for IV antibiotics    Barriers / Limitations:  None    Method:  Brief focused, printed material and  reinforcement    General Topics:  Plan of care reviewed    Outcome:  Patient tolerated treatment without incident. Appts requested for next 6 weeks of treatment. Patient prefers 10am during the weekdays, and 9 am weekends. Left in stable condition.

## 2023-01-25 ENCOUNTER — NURSE ONLY (OUTPATIENT)
Dept: HEMATOLOGY/ONCOLOGY | Facility: HOSPITAL | Age: 88
End: 2023-01-25
Attending: INTERNAL MEDICINE
Payer: MEDICARE

## 2023-01-25 VITALS
SYSTOLIC BLOOD PRESSURE: 153 MMHG | DIASTOLIC BLOOD PRESSURE: 71 MMHG | HEART RATE: 98 BPM | RESPIRATION RATE: 16 BRPM | TEMPERATURE: 98 F | OXYGEN SATURATION: 95 %

## 2023-01-25 DIAGNOSIS — M86.9 OSTEOMYELITIS OF RIGHT HAND, UNSPECIFIED TYPE (HCC): Primary | ICD-10-CM

## 2023-01-25 PROCEDURE — 96374 THER/PROPH/DIAG INJ IV PUSH: CPT

## 2023-01-25 NOTE — PROGRESS NOTES
Education Record    Learner:  Patient    Disease / Diagnosis: osteo    Barriers / Limitations:  None    Method:  Brief focused, printed material and  reinforcement    General Topics:  Plan of care reviewed    Outcome:  Shows understanding    Daptomycin given.  No complaints

## 2023-01-26 ENCOUNTER — NURSE ONLY (OUTPATIENT)
Dept: HEMATOLOGY/ONCOLOGY | Facility: HOSPITAL | Age: 88
End: 2023-01-26
Attending: INTERNAL MEDICINE
Payer: MEDICARE

## 2023-01-26 VITALS
DIASTOLIC BLOOD PRESSURE: 76 MMHG | SYSTOLIC BLOOD PRESSURE: 147 MMHG | TEMPERATURE: 98 F | HEART RATE: 82 BPM | OXYGEN SATURATION: 95 % | RESPIRATION RATE: 18 BRPM

## 2023-01-26 DIAGNOSIS — M86.9 OSTEOMYELITIS OF RIGHT HAND, UNSPECIFIED TYPE (HCC): Primary | ICD-10-CM

## 2023-01-26 PROCEDURE — 96374 THER/PROPH/DIAG INJ IV PUSH: CPT

## 2023-01-27 ENCOUNTER — NURSE ONLY (OUTPATIENT)
Dept: HEMATOLOGY/ONCOLOGY | Facility: HOSPITAL | Age: 88
End: 2023-01-27
Attending: INTERNAL MEDICINE
Payer: MEDICARE

## 2023-01-27 VITALS
SYSTOLIC BLOOD PRESSURE: 146 MMHG | HEART RATE: 76 BPM | OXYGEN SATURATION: 94 % | RESPIRATION RATE: 18 BRPM | DIASTOLIC BLOOD PRESSURE: 76 MMHG | TEMPERATURE: 98 F

## 2023-01-27 DIAGNOSIS — M86.9 OSTEOMYELITIS OF RIGHT HAND, UNSPECIFIED TYPE (HCC): Primary | ICD-10-CM

## 2023-01-27 PROCEDURE — 96374 THER/PROPH/DIAG INJ IV PUSH: CPT

## 2023-01-27 NOTE — PROGRESS NOTES
Education Record    Learner:  Patient and Family Member    Disease / Diagnosis: Osteomyelitis. Here for IV abx    Barriers / Limitations:  None   Comments:    Method:  Discussion   Comments:    General Topics:  Medication and Plan of care reviewed   Comments:    Outcome:  Shows understanding   Comments:    Daptomycin infused without issues.  Patient discharged in stable condition accompanied by her family member

## 2023-01-28 ENCOUNTER — NURSE ONLY (OUTPATIENT)
Dept: HEMATOLOGY/ONCOLOGY | Facility: HOSPITAL | Age: 88
End: 2023-01-28
Attending: INTERNAL MEDICINE
Payer: MEDICARE

## 2023-01-28 VITALS
TEMPERATURE: 98 F | HEART RATE: 82 BPM | DIASTOLIC BLOOD PRESSURE: 72 MMHG | SYSTOLIC BLOOD PRESSURE: 152 MMHG | RESPIRATION RATE: 18 BRPM | OXYGEN SATURATION: 97 %

## 2023-01-28 DIAGNOSIS — M86.9 OSTEOMYELITIS OF RIGHT HAND, UNSPECIFIED TYPE (HCC): Primary | ICD-10-CM

## 2023-01-28 PROCEDURE — 96374 THER/PROPH/DIAG INJ IV PUSH: CPT

## 2023-01-28 NOTE — PROGRESS NOTES
Education Record    Learner:  Patient and Family Member    Disease / Diagnosis: osteomyelitis    Barriers / Limitations:  None   Comments:    Method:  Brief focused and Discussion   Comments:    General Topics:  Medication, Plan of care reviewed and Fall risk and prevention   Comments:    Outcome:  Shows understanding   Comments:

## 2023-01-29 ENCOUNTER — NURSE ONLY (OUTPATIENT)
Dept: HEMATOLOGY/ONCOLOGY | Facility: HOSPITAL | Age: 88
End: 2023-01-29
Attending: INTERNAL MEDICINE
Payer: MEDICARE

## 2023-01-29 VITALS
HEIGHT: 64.02 IN | HEART RATE: 87 BPM | DIASTOLIC BLOOD PRESSURE: 71 MMHG | BODY MASS INDEX: 28 KG/M2 | SYSTOLIC BLOOD PRESSURE: 147 MMHG | RESPIRATION RATE: 16 BRPM | TEMPERATURE: 99 F | OXYGEN SATURATION: 95 %

## 2023-01-29 DIAGNOSIS — M86.9 OSTEOMYELITIS OF RIGHT HAND, UNSPECIFIED TYPE (HCC): Primary | ICD-10-CM

## 2023-01-29 PROCEDURE — 96374 THER/PROPH/DIAG INJ IV PUSH: CPT

## 2023-01-29 NOTE — PROGRESS NOTES
Education Record    Learner:  Patient    Disease / Diagnosis: Here for IV antibiotics    Barriers / Limitations:  None    Method:  Brief focused, printed material and  reinforcement    General Topics:  Plan of care reviewed    Outcome: Patient tolerated treatment without incident. Left in stable condition.

## 2023-01-30 ENCOUNTER — NURSE ONLY (OUTPATIENT)
Dept: HEMATOLOGY/ONCOLOGY | Facility: HOSPITAL | Age: 88
End: 2023-01-30
Attending: INTERNAL MEDICINE
Payer: MEDICARE

## 2023-01-30 VITALS
DIASTOLIC BLOOD PRESSURE: 76 MMHG | SYSTOLIC BLOOD PRESSURE: 137 MMHG | HEART RATE: 85 BPM | OXYGEN SATURATION: 97 % | RESPIRATION RATE: 18 BRPM | TEMPERATURE: 98 F

## 2023-01-30 DIAGNOSIS — M86.9 OSTEOMYELITIS OF RIGHT HAND, UNSPECIFIED TYPE (HCC): Primary | ICD-10-CM

## 2023-01-30 LAB
ALBUMIN SERPL-MCNC: 3.4 G/DL (ref 3.4–5)
ALBUMIN/GLOB SERPL: 1.1 {RATIO} (ref 1–2)
ALP LIVER SERPL-CCNC: 62 U/L
ALT SERPL-CCNC: 25 U/L
ANION GAP SERPL CALC-SCNC: 3 MMOL/L (ref 0–18)
AST SERPL-CCNC: 23 U/L (ref 15–37)
BASOPHILS # BLD AUTO: 0.04 X10(3) UL (ref 0–0.2)
BASOPHILS NFR BLD AUTO: 0.8 %
BILIRUB SERPL-MCNC: 0.6 MG/DL (ref 0.1–2)
BUN BLD-MCNC: 14 MG/DL (ref 7–18)
CALCIUM BLD-MCNC: 9 MG/DL (ref 8.5–10.1)
CHLORIDE SERPL-SCNC: 104 MMOL/L (ref 98–112)
CK SERPL-CCNC: 80 U/L
CO2 SERPL-SCNC: 30 MMOL/L (ref 21–32)
CREAT BLD-MCNC: 0.62 MG/DL
CRP SERPL-MCNC: <0.29 MG/DL (ref ?–0.3)
EOSINOPHIL # BLD AUTO: 0.04 X10(3) UL (ref 0–0.7)
EOSINOPHIL NFR BLD AUTO: 0.8 %
ERYTHROCYTE [DISTWIDTH] IN BLOOD BY AUTOMATED COUNT: 15.2 %
ERYTHROCYTE [SEDIMENTATION RATE] IN BLOOD: 10 MM/HR
FASTING STATUS PATIENT QL REPORTED: NO
GFR SERPLBLD BASED ON 1.73 SQ M-ARVRAT: 86 ML/MIN/1.73M2 (ref 60–?)
GLOBULIN PLAS-MCNC: 3.2 G/DL (ref 2.8–4.4)
GLUCOSE BLD-MCNC: 100 MG/DL (ref 70–99)
HCT VFR BLD AUTO: 38.9 %
HGB BLD-MCNC: 12.7 G/DL
IMM GRANULOCYTES # BLD AUTO: 0.01 X10(3) UL (ref 0–1)
IMM GRANULOCYTES NFR BLD: 0.2 %
LYMPHOCYTES # BLD AUTO: 1.17 X10(3) UL (ref 1–4)
LYMPHOCYTES NFR BLD AUTO: 24 %
MCH RBC QN AUTO: 30 PG (ref 26–34)
MCHC RBC AUTO-ENTMCNC: 32.6 G/DL (ref 31–37)
MCV RBC AUTO: 91.7 FL
MONOCYTES # BLD AUTO: 0.5 X10(3) UL (ref 0.1–1)
MONOCYTES NFR BLD AUTO: 10.3 %
NEUTROPHILS # BLD AUTO: 3.11 X10 (3) UL (ref 1.5–7.7)
NEUTROPHILS # BLD AUTO: 3.11 X10(3) UL (ref 1.5–7.7)
NEUTROPHILS NFR BLD AUTO: 63.9 %
OSMOLALITY SERPL CALC.SUM OF ELEC: 285 MOSM/KG (ref 275–295)
PLATELET # BLD AUTO: 288 10(3)UL (ref 150–450)
POTASSIUM SERPL-SCNC: 3.9 MMOL/L (ref 3.5–5.1)
PROT SERPL-MCNC: 6.6 G/DL (ref 6.4–8.2)
RBC # BLD AUTO: 4.24 X10(6)UL
SODIUM SERPL-SCNC: 137 MMOL/L (ref 136–145)
WBC # BLD AUTO: 4.9 X10(3) UL (ref 4–11)

## 2023-01-30 PROCEDURE — 82550 ASSAY OF CK (CPK): CPT

## 2023-01-30 PROCEDURE — 96374 THER/PROPH/DIAG INJ IV PUSH: CPT

## 2023-01-30 PROCEDURE — 86140 C-REACTIVE PROTEIN: CPT

## 2023-01-30 PROCEDURE — 85652 RBC SED RATE AUTOMATED: CPT

## 2023-01-30 PROCEDURE — 80053 COMPREHEN METABOLIC PANEL: CPT

## 2023-01-30 PROCEDURE — 85025 COMPLETE CBC W/AUTO DIFF WBC: CPT

## 2023-01-30 NOTE — PROGRESS NOTES
Education Record    Learner:  Patient and Family Member    Disease / Diagnosis: osteomyelitis    Barriers / Limitations:  None   Comments:    Method:  Discussion   Comments:    General Topics:  Plan of care reviewed   Comments:    Outcome:  Shows understanding   Comments:    Pt here for CLL, PICC dressing change, and daptomycin. Pt discharged in stable condition.

## 2023-01-31 ENCOUNTER — NURSE ONLY (OUTPATIENT)
Dept: HEMATOLOGY/ONCOLOGY | Facility: HOSPITAL | Age: 88
End: 2023-01-31
Attending: INTERNAL MEDICINE
Payer: MEDICARE

## 2023-01-31 VITALS
WEIGHT: 163.19 LBS | HEART RATE: 82 BPM | TEMPERATURE: 98 F | RESPIRATION RATE: 18 BRPM | DIASTOLIC BLOOD PRESSURE: 77 MMHG | SYSTOLIC BLOOD PRESSURE: 148 MMHG | HEIGHT: 64.02 IN | OXYGEN SATURATION: 97 % | BODY MASS INDEX: 27.86 KG/M2

## 2023-01-31 DIAGNOSIS — M86.9 OSTEOMYELITIS OF RIGHT HAND, UNSPECIFIED TYPE (HCC): Primary | ICD-10-CM

## 2023-01-31 PROCEDURE — 96374 THER/PROPH/DIAG INJ IV PUSH: CPT

## 2023-01-31 NOTE — PROGRESS NOTES
Education Record    Learner:  Patient    Disease / Diagnosis: Dapto    Barriers / Limitations:  None   Comments:    Method:  Brief focused and Reinforcement   Comments:    General Topics:  Diet, Medication, Side effects and symptom management and Plan of care reviewed   Comments:    Outcome:  Shows understanding   Comments: Patient tolerated abx and discharged in stable condition.

## 2023-02-01 ENCOUNTER — NURSE ONLY (OUTPATIENT)
Dept: HEMATOLOGY/ONCOLOGY | Facility: HOSPITAL | Age: 88
End: 2023-02-01
Attending: INTERNAL MEDICINE
Payer: MEDICARE

## 2023-02-01 VITALS
TEMPERATURE: 98 F | DIASTOLIC BLOOD PRESSURE: 72 MMHG | SYSTOLIC BLOOD PRESSURE: 130 MMHG | RESPIRATION RATE: 18 BRPM | HEART RATE: 77 BPM | OXYGEN SATURATION: 96 %

## 2023-02-01 DIAGNOSIS — M86.9 OSTEOMYELITIS OF RIGHT HAND, UNSPECIFIED TYPE (HCC): Primary | ICD-10-CM

## 2023-02-01 PROCEDURE — 96374 THER/PROPH/DIAG INJ IV PUSH: CPT

## 2023-02-01 NOTE — PROGRESS NOTES
Education Record    Learner:  Patient    Disease / Diagnosis: Osteomyelitis of the right hand    Barriers / Limitations:  None   Comments:    Method:  Brief focused and Reinforcement   Comments:    General Topics:  Plan of care reviewed and Fall risk and prevention   Comments:    Outcome:  Shows understanding   Comments:

## 2023-02-02 ENCOUNTER — NURSE ONLY (OUTPATIENT)
Dept: HEMATOLOGY/ONCOLOGY | Facility: HOSPITAL | Age: 88
End: 2023-02-02
Attending: INTERNAL MEDICINE
Payer: MEDICARE

## 2023-02-02 VITALS
HEART RATE: 87 BPM | DIASTOLIC BLOOD PRESSURE: 68 MMHG | HEIGHT: 64.02 IN | OXYGEN SATURATION: 97 % | RESPIRATION RATE: 18 BRPM | SYSTOLIC BLOOD PRESSURE: 153 MMHG | TEMPERATURE: 98 F | WEIGHT: 163 LBS | BODY MASS INDEX: 27.83 KG/M2

## 2023-02-02 DIAGNOSIS — M86.9 OSTEOMYELITIS OF RIGHT HAND, UNSPECIFIED TYPE (HCC): Primary | ICD-10-CM

## 2023-02-02 PROCEDURE — 96374 THER/PROPH/DIAG INJ IV PUSH: CPT

## 2023-02-02 NOTE — PROGRESS NOTES
Education Record    Learner:  Patient    Disease / Diagnosis: Pt here for Daptomycin injection       Barriers / Limitations:  None    Method:  Brief focused, printed material and  reinforcement    General Topics:  Plan of care reviewed    Outcome:  Shows understanding

## 2023-02-03 ENCOUNTER — NURSE ONLY (OUTPATIENT)
Dept: HEMATOLOGY/ONCOLOGY | Facility: HOSPITAL | Age: 88
End: 2023-02-03
Attending: INTERNAL MEDICINE
Payer: MEDICARE

## 2023-02-03 VITALS
HEART RATE: 83 BPM | OXYGEN SATURATION: 94 % | TEMPERATURE: 97 F | SYSTOLIC BLOOD PRESSURE: 145 MMHG | RESPIRATION RATE: 18 BRPM | DIASTOLIC BLOOD PRESSURE: 64 MMHG

## 2023-02-03 DIAGNOSIS — M86.9 OSTEOMYELITIS OF RIGHT HAND, UNSPECIFIED TYPE (HCC): Primary | ICD-10-CM

## 2023-02-03 PROCEDURE — 96374 THER/PROPH/DIAG INJ IV PUSH: CPT

## 2023-02-04 ENCOUNTER — NURSE ONLY (OUTPATIENT)
Dept: HEMATOLOGY/ONCOLOGY | Facility: HOSPITAL | Age: 88
End: 2023-02-04
Attending: INTERNAL MEDICINE
Payer: MEDICARE

## 2023-02-04 VITALS
RESPIRATION RATE: 18 BRPM | HEART RATE: 80 BPM | DIASTOLIC BLOOD PRESSURE: 64 MMHG | SYSTOLIC BLOOD PRESSURE: 135 MMHG | TEMPERATURE: 98 F | OXYGEN SATURATION: 93 %

## 2023-02-04 DIAGNOSIS — M86.9 OSTEOMYELITIS OF RIGHT HAND, UNSPECIFIED TYPE (HCC): Primary | ICD-10-CM

## 2023-02-04 PROCEDURE — 96374 THER/PROPH/DIAG INJ IV PUSH: CPT

## 2023-02-04 NOTE — PROGRESS NOTES
Education Record    Learner:  Patient    Disease / Diagnosis:Osteomyelitis of right hand, unspecified type     Barriers / Limitations:  None   Comments:    Method:  Brief focused   Comments:    General Topics:  Infection, Medication, Pain, Precautions, Procedure, Side effects and symptom management, Plan of care reviewed and Fall risk and prevention   Comments:    Outcome:  Shows understanding   Comments:

## 2023-02-05 ENCOUNTER — NURSE ONLY (OUTPATIENT)
Dept: HEMATOLOGY/ONCOLOGY | Facility: HOSPITAL | Age: 88
End: 2023-02-05
Attending: INTERNAL MEDICINE
Payer: MEDICARE

## 2023-02-05 VITALS
TEMPERATURE: 98 F | HEART RATE: 97 BPM | SYSTOLIC BLOOD PRESSURE: 153 MMHG | RESPIRATION RATE: 18 BRPM | DIASTOLIC BLOOD PRESSURE: 90 MMHG | OXYGEN SATURATION: 97 %

## 2023-02-05 DIAGNOSIS — M86.9 OSTEOMYELITIS OF RIGHT HAND, UNSPECIFIED TYPE (HCC): Primary | ICD-10-CM

## 2023-02-05 PROCEDURE — 96374 THER/PROPH/DIAG INJ IV PUSH: CPT

## 2023-02-05 NOTE — PROGRESS NOTES
Education Record    Learner:  Patient    Disease / Diagnosis: Here for IV antibiotics    Barriers / Limitations:  None    Method:  Brief focused, printed material and  reinforcement    General Topics:  Plan of care reviewed    Outcome:  Shows understanding. Pt tolerated injection without incident. Left in stable condition.

## 2023-02-06 ENCOUNTER — TELEPHONE (OUTPATIENT)
Dept: PHARMACY | Facility: HOSPITAL | Age: 88
End: 2023-02-06

## 2023-02-06 ENCOUNTER — NURSE ONLY (OUTPATIENT)
Dept: HEMATOLOGY/ONCOLOGY | Facility: HOSPITAL | Age: 88
End: 2023-02-06
Attending: INTERNAL MEDICINE
Payer: MEDICARE

## 2023-02-06 VITALS
SYSTOLIC BLOOD PRESSURE: 176 MMHG | DIASTOLIC BLOOD PRESSURE: 71 MMHG | HEART RATE: 82 BPM | OXYGEN SATURATION: 97 % | TEMPERATURE: 98 F | RESPIRATION RATE: 16 BRPM

## 2023-02-06 DIAGNOSIS — M86.9 OSTEOMYELITIS OF RIGHT HAND, UNSPECIFIED TYPE (HCC): Primary | ICD-10-CM

## 2023-02-06 LAB
ALBUMIN SERPL-MCNC: 3.5 G/DL (ref 3.4–5)
ALBUMIN/GLOB SERPL: 1.1 {RATIO} (ref 1–2)
ALP LIVER SERPL-CCNC: 59 U/L
ALT SERPL-CCNC: 32 U/L
ANION GAP SERPL CALC-SCNC: 5 MMOL/L (ref 0–18)
AST SERPL-CCNC: 49 U/L (ref 15–37)
BASOPHILS # BLD AUTO: 0.04 X10(3) UL (ref 0–0.2)
BASOPHILS NFR BLD AUTO: 0.8 %
BILIRUB SERPL-MCNC: 0.5 MG/DL (ref 0.1–2)
BUN BLD-MCNC: 16 MG/DL (ref 7–18)
CALCIUM BLD-MCNC: 9.1 MG/DL (ref 8.5–10.1)
CHLORIDE SERPL-SCNC: 103 MMOL/L (ref 98–112)
CK SERPL-CCNC: 685 U/L
CO2 SERPL-SCNC: 28 MMOL/L (ref 21–32)
CREAT BLD-MCNC: 0.6 MG/DL
CRP SERPL-MCNC: <0.29 MG/DL (ref ?–0.3)
EOSINOPHIL # BLD AUTO: 0.06 X10(3) UL (ref 0–0.7)
EOSINOPHIL NFR BLD AUTO: 1.2 %
ERYTHROCYTE [DISTWIDTH] IN BLOOD BY AUTOMATED COUNT: 15 %
ERYTHROCYTE [SEDIMENTATION RATE] IN BLOOD: 13 MM/HR
FASTING STATUS PATIENT QL REPORTED: NO
GFR SERPLBLD BASED ON 1.73 SQ M-ARVRAT: 87 ML/MIN/1.73M2 (ref 60–?)
GLOBULIN PLAS-MCNC: 3.1 G/DL (ref 2.8–4.4)
GLUCOSE BLD-MCNC: 112 MG/DL (ref 70–99)
HCT VFR BLD AUTO: 37.6 %
HGB BLD-MCNC: 12.2 G/DL
IMM GRANULOCYTES # BLD AUTO: 0.02 X10(3) UL (ref 0–1)
IMM GRANULOCYTES NFR BLD: 0.4 %
LYMPHOCYTES # BLD AUTO: 1.13 X10(3) UL (ref 1–4)
LYMPHOCYTES NFR BLD AUTO: 23.1 %
MCH RBC QN AUTO: 29.8 PG (ref 26–34)
MCHC RBC AUTO-ENTMCNC: 32.4 G/DL (ref 31–37)
MCV RBC AUTO: 91.7 FL
MONOCYTES # BLD AUTO: 0.52 X10(3) UL (ref 0.1–1)
MONOCYTES NFR BLD AUTO: 10.6 %
NEUTROPHILS # BLD AUTO: 3.13 X10 (3) UL (ref 1.5–7.7)
NEUTROPHILS # BLD AUTO: 3.13 X10(3) UL (ref 1.5–7.7)
NEUTROPHILS NFR BLD AUTO: 63.9 %
OSMOLALITY SERPL CALC.SUM OF ELEC: 284 MOSM/KG (ref 275–295)
PLATELET # BLD AUTO: 256 10(3)UL (ref 150–450)
POTASSIUM SERPL-SCNC: 3.5 MMOL/L (ref 3.5–5.1)
PROT SERPL-MCNC: 6.6 G/DL (ref 6.4–8.2)
RBC # BLD AUTO: 4.1 X10(6)UL
SODIUM SERPL-SCNC: 136 MMOL/L (ref 136–145)
WBC # BLD AUTO: 4.9 X10(3) UL (ref 4–11)

## 2023-02-06 PROCEDURE — 85025 COMPLETE CBC W/AUTO DIFF WBC: CPT

## 2023-02-06 PROCEDURE — 96374 THER/PROPH/DIAG INJ IV PUSH: CPT

## 2023-02-06 PROCEDURE — 86140 C-REACTIVE PROTEIN: CPT

## 2023-02-06 PROCEDURE — 85652 RBC SED RATE AUTOMATED: CPT

## 2023-02-06 PROCEDURE — 82550 ASSAY OF CK (CPK): CPT

## 2023-02-06 PROCEDURE — 80053 COMPREHEN METABOLIC PANEL: CPT

## 2023-02-06 NOTE — PROGRESS NOTES
Spoke with Dr. Jackie Parkinson about the patient's elevated AST and CK level. Patient is still taking her simvastatin. Per MD - pt to hold the simvastatin and continue Dapto for 1 week. We will draw labs and reassess next Monday. I called the patient and she verbalized understanding to hold her nightly simvastatin and wait to resume until instructed by her MD/PCP. She will notify her PCP as well. We will hold off on changing to Vancomycin until next week and reassess the labs. Draw CPK on Thursday 2/9 and Monday 2/13.

## 2023-02-07 ENCOUNTER — NURSE ONLY (OUTPATIENT)
Dept: HEMATOLOGY/ONCOLOGY | Facility: HOSPITAL | Age: 88
End: 2023-02-07
Attending: INTERNAL MEDICINE
Payer: MEDICARE

## 2023-02-07 VITALS
WEIGHT: 163.13 LBS | BODY MASS INDEX: 27.85 KG/M2 | DIASTOLIC BLOOD PRESSURE: 71 MMHG | RESPIRATION RATE: 16 BRPM | HEIGHT: 64.02 IN | HEART RATE: 84 BPM | TEMPERATURE: 98 F | SYSTOLIC BLOOD PRESSURE: 153 MMHG | OXYGEN SATURATION: 96 %

## 2023-02-07 DIAGNOSIS — M86.9 OSTEOMYELITIS OF RIGHT HAND, UNSPECIFIED TYPE (HCC): Primary | ICD-10-CM

## 2023-02-07 PROCEDURE — 96374 THER/PROPH/DIAG INJ IV PUSH: CPT

## 2023-02-07 NOTE — PROGRESS NOTES
Education Record    Learner:  Patient    Disease / Diagnosis: IV antibiotics    Barriers / Limitations:  None    Method:  Brief focused, printed material and  reinforcement    General Topics:  Plan of care reviewed    Outcome:  Shows understanding. Pt tolerated treatment without incident. Pt verbalized understanding of conversation between her and the pharmacist. Pt says she is seeing the MD for a wellness visit on Monday and also called the office to notify them of her stopping her simvastatin during the IV antibiotic treatment. Repeat CK level on Thursday, ordered already. PICC line working appropriately. Left in stable condition.

## 2023-02-08 ENCOUNTER — NURSE ONLY (OUTPATIENT)
Dept: HEMATOLOGY/ONCOLOGY | Facility: HOSPITAL | Age: 88
End: 2023-02-08
Attending: INTERNAL MEDICINE
Payer: MEDICARE

## 2023-02-08 DIAGNOSIS — M86.9 OSTEOMYELITIS OF RIGHT HAND, UNSPECIFIED TYPE (HCC): Primary | ICD-10-CM

## 2023-02-08 PROCEDURE — 96374 THER/PROPH/DIAG INJ IV PUSH: CPT

## 2023-02-08 NOTE — PROGRESS NOTES
Education Record    Learner:  Patient and Family Member    Disease / Diagnosis:iv antibiotic    Barriers / Limitations:  None   Comments:    Method:  Discussion   Comments:    General Topics:  Medication and Side effects and symptom management   Comments:    Outcome:  Shows understanding   Comments:

## 2023-02-09 ENCOUNTER — NURSE ONLY (OUTPATIENT)
Dept: HEMATOLOGY/ONCOLOGY | Facility: HOSPITAL | Age: 88
End: 2023-02-09
Attending: INTERNAL MEDICINE
Payer: MEDICARE

## 2023-02-09 VITALS
OXYGEN SATURATION: 93 % | DIASTOLIC BLOOD PRESSURE: 63 MMHG | SYSTOLIC BLOOD PRESSURE: 135 MMHG | TEMPERATURE: 98 F | RESPIRATION RATE: 20 BRPM | HEART RATE: 88 BPM

## 2023-02-09 DIAGNOSIS — M86.9 OSTEOMYELITIS OF RIGHT HAND, UNSPECIFIED TYPE (HCC): Primary | ICD-10-CM

## 2023-02-09 PROCEDURE — 96374 THER/PROPH/DIAG INJ IV PUSH: CPT

## 2023-02-09 NOTE — PROGRESS NOTES
Education Record    Learner:  Patient and Spouse    Disease / Diagnosis: osteomyelitis - dapto IV push. Barriers / Limitations:  None   Comments:    Method:  Brief focused   Comments:    General Topics:  Plan of care reviewed   Comments:    Outcome:  Shows understanding   Comments: Tolerated well without issue. CK level drawn.

## 2023-02-10 ENCOUNTER — NURSE ONLY (OUTPATIENT)
Dept: HEMATOLOGY/ONCOLOGY | Facility: HOSPITAL | Age: 88
End: 2023-02-10
Attending: INTERNAL MEDICINE
Payer: MEDICARE

## 2023-02-10 VITALS
HEART RATE: 77 BPM | OXYGEN SATURATION: 97 % | SYSTOLIC BLOOD PRESSURE: 149 MMHG | DIASTOLIC BLOOD PRESSURE: 63 MMHG | RESPIRATION RATE: 18 BRPM | TEMPERATURE: 98 F

## 2023-02-10 DIAGNOSIS — M86.9 OSTEOMYELITIS OF RIGHT HAND, UNSPECIFIED TYPE (HCC): Primary | ICD-10-CM

## 2023-02-10 PROCEDURE — 96374 THER/PROPH/DIAG INJ IV PUSH: CPT

## 2023-02-10 NOTE — PROGRESS NOTES
Education Record    Learner:  Patient and Family Member    Disease / Diagnosis: finger infection    Barriers / Limitations:  None   Comments:    Method:  Brief focused   Comments:    General Topics:  Plan of care reviewed   Comments:    Outcome:  Shows understanding   Comments:

## 2023-02-11 ENCOUNTER — NURSE ONLY (OUTPATIENT)
Dept: HEMATOLOGY/ONCOLOGY | Facility: HOSPITAL | Age: 88
End: 2023-02-11
Attending: INTERNAL MEDICINE
Payer: MEDICARE

## 2023-02-11 VITALS
RESPIRATION RATE: 18 BRPM | HEART RATE: 71 BPM | DIASTOLIC BLOOD PRESSURE: 75 MMHG | OXYGEN SATURATION: 96 % | SYSTOLIC BLOOD PRESSURE: 124 MMHG | TEMPERATURE: 97 F

## 2023-02-11 DIAGNOSIS — M86.9 OSTEOMYELITIS OF RIGHT HAND, UNSPECIFIED TYPE (HCC): Primary | ICD-10-CM

## 2023-02-11 PROCEDURE — 96374 THER/PROPH/DIAG INJ IV PUSH: CPT

## 2023-02-11 NOTE — PROGRESS NOTES
Education Record    Learner:  Patient    Disease / Diagnosis:  Osteomyelitis. Here for IV abx. Barriers / Limitations:  None   Comments:    Method:  Discussion   Comments:    General Topics:  Medication and Plan of care reviewed   Comments:    Outcome:  Shows understanding   Comments:    Daptomycin given IVP without issues. Patient discharged in stable condition.

## 2023-02-12 ENCOUNTER — NURSE ONLY (OUTPATIENT)
Dept: HEMATOLOGY/ONCOLOGY | Facility: HOSPITAL | Age: 88
End: 2023-02-12
Attending: INTERNAL MEDICINE
Payer: MEDICARE

## 2023-02-12 VITALS
RESPIRATION RATE: 18 BRPM | SYSTOLIC BLOOD PRESSURE: 131 MMHG | OXYGEN SATURATION: 95 % | DIASTOLIC BLOOD PRESSURE: 64 MMHG | TEMPERATURE: 98 F | HEART RATE: 73 BPM

## 2023-02-12 DIAGNOSIS — M86.9 OSTEOMYELITIS OF RIGHT HAND, UNSPECIFIED TYPE (HCC): Primary | ICD-10-CM

## 2023-02-12 PROCEDURE — 96374 THER/PROPH/DIAG INJ IV PUSH: CPT

## 2023-02-12 NOTE — PROGRESS NOTES
Education Record    Learner:  Patient and Family Member    Disease / Diagnosis: Osteomyelitis    Barriers / Limitations:  None   Comments:    Method:  Brief focused   Comments:    General Topics:  Plan of care reviewed   Comments:    Outcome:  Shows understanding   Comments: complains of pain/discomfort

## 2023-02-13 ENCOUNTER — NURSE ONLY (OUTPATIENT)
Dept: HEMATOLOGY/ONCOLOGY | Facility: HOSPITAL | Age: 88
End: 2023-02-13
Attending: INTERNAL MEDICINE
Payer: MEDICARE

## 2023-02-13 VITALS
DIASTOLIC BLOOD PRESSURE: 68 MMHG | TEMPERATURE: 98 F | HEART RATE: 83 BPM | OXYGEN SATURATION: 96 % | SYSTOLIC BLOOD PRESSURE: 150 MMHG | RESPIRATION RATE: 18 BRPM

## 2023-02-13 DIAGNOSIS — M86.9 OSTEOMYELITIS OF RIGHT HAND, UNSPECIFIED TYPE (HCC): Primary | ICD-10-CM

## 2023-02-13 LAB
ALBUMIN SERPL-MCNC: 3.6 G/DL (ref 3.4–5)
ALBUMIN/GLOB SERPL: 1.2 {RATIO} (ref 1–2)
ALP LIVER SERPL-CCNC: 64 U/L
ALT SERPL-CCNC: 29 U/L
ANION GAP SERPL CALC-SCNC: 3 MMOL/L (ref 0–18)
AST SERPL-CCNC: 26 U/L (ref 15–37)
BASOPHILS # BLD AUTO: 0.05 X10(3) UL (ref 0–0.2)
BASOPHILS NFR BLD AUTO: 1 %
BILIRUB SERPL-MCNC: 0.4 MG/DL (ref 0.1–2)
BUN BLD-MCNC: 17 MG/DL (ref 7–18)
CALCIUM BLD-MCNC: 9.4 MG/DL (ref 8.5–10.1)
CHLORIDE SERPL-SCNC: 104 MMOL/L (ref 98–112)
CK SERPL-CCNC: 98 U/L
CO2 SERPL-SCNC: 30 MMOL/L (ref 21–32)
CREAT BLD-MCNC: 0.59 MG/DL
CRP SERPL-MCNC: <0.29 MG/DL (ref ?–0.3)
EOSINOPHIL # BLD AUTO: 0.06 X10(3) UL (ref 0–0.7)
EOSINOPHIL NFR BLD AUTO: 1.2 %
ERYTHROCYTE [DISTWIDTH] IN BLOOD BY AUTOMATED COUNT: 15.2 %
ERYTHROCYTE [SEDIMENTATION RATE] IN BLOOD: 14 MM/HR
FASTING STATUS PATIENT QL REPORTED: NO
GFR SERPLBLD BASED ON 1.73 SQ M-ARVRAT: 87 ML/MIN/1.73M2 (ref 60–?)
GLOBULIN PLAS-MCNC: 3.1 G/DL (ref 2.8–4.4)
GLUCOSE BLD-MCNC: 103 MG/DL (ref 70–99)
HCT VFR BLD AUTO: 38 %
HGB BLD-MCNC: 12.6 G/DL
IMM GRANULOCYTES # BLD AUTO: 0.02 X10(3) UL (ref 0–1)
IMM GRANULOCYTES NFR BLD: 0.4 %
LYMPHOCYTES # BLD AUTO: 1.18 X10(3) UL (ref 1–4)
LYMPHOCYTES NFR BLD AUTO: 23.7 %
MCH RBC QN AUTO: 30.3 PG (ref 26–34)
MCHC RBC AUTO-ENTMCNC: 33.2 G/DL (ref 31–37)
MCV RBC AUTO: 91.3 FL
MONOCYTES # BLD AUTO: 0.53 X10(3) UL (ref 0.1–1)
MONOCYTES NFR BLD AUTO: 10.6 %
NEUTROPHILS # BLD AUTO: 3.14 X10 (3) UL (ref 1.5–7.7)
NEUTROPHILS # BLD AUTO: 3.14 X10(3) UL (ref 1.5–7.7)
NEUTROPHILS NFR BLD AUTO: 63.1 %
OSMOLALITY SERPL CALC.SUM OF ELEC: 286 MOSM/KG (ref 275–295)
PLATELET # BLD AUTO: 274 10(3)UL (ref 150–450)
POTASSIUM SERPL-SCNC: 4 MMOL/L (ref 3.5–5.1)
PROT SERPL-MCNC: 6.7 G/DL (ref 6.4–8.2)
RBC # BLD AUTO: 4.16 X10(6)UL
SODIUM SERPL-SCNC: 137 MMOL/L (ref 136–145)
WBC # BLD AUTO: 5 X10(3) UL (ref 4–11)

## 2023-02-13 PROCEDURE — 96374 THER/PROPH/DIAG INJ IV PUSH: CPT

## 2023-02-13 PROCEDURE — 80053 COMPREHEN METABOLIC PANEL: CPT

## 2023-02-13 PROCEDURE — 85652 RBC SED RATE AUTOMATED: CPT

## 2023-02-13 PROCEDURE — 85025 COMPLETE CBC W/AUTO DIFF WBC: CPT

## 2023-02-13 PROCEDURE — 82550 ASSAY OF CK (CPK): CPT

## 2023-02-13 PROCEDURE — 86140 C-REACTIVE PROTEIN: CPT

## 2023-02-14 ENCOUNTER — NURSE ONLY (OUTPATIENT)
Dept: HEMATOLOGY/ONCOLOGY | Facility: HOSPITAL | Age: 88
End: 2023-02-14
Attending: INTERNAL MEDICINE
Payer: MEDICARE

## 2023-02-14 VITALS
RESPIRATION RATE: 18 BRPM | DIASTOLIC BLOOD PRESSURE: 70 MMHG | OXYGEN SATURATION: 96 % | HEART RATE: 79 BPM | TEMPERATURE: 98 F | SYSTOLIC BLOOD PRESSURE: 153 MMHG

## 2023-02-14 DIAGNOSIS — M86.9 OSTEOMYELITIS OF RIGHT HAND, UNSPECIFIED TYPE (HCC): Primary | ICD-10-CM

## 2023-02-14 PROCEDURE — 96374 THER/PROPH/DIAG INJ IV PUSH: CPT

## 2023-02-14 NOTE — PROGRESS NOTES
Education Record    Learner:  Patient and Family Member    Disease / Diagnosis: osteomyelitis - dapto. Barriers / Limitations:  None   Comments:    Method:  Brief focused and Discussion   Comments:    General Topics:  Plan of care reviewed   Comments:    Outcome:  Shows understanding   Comments: Tolerated well without issue.

## 2023-02-15 ENCOUNTER — NURSE ONLY (OUTPATIENT)
Dept: HEMATOLOGY/ONCOLOGY | Facility: HOSPITAL | Age: 88
End: 2023-02-15
Attending: INTERNAL MEDICINE
Payer: MEDICARE

## 2023-02-15 VITALS
DIASTOLIC BLOOD PRESSURE: 78 MMHG | RESPIRATION RATE: 18 BRPM | OXYGEN SATURATION: 97 % | HEART RATE: 84 BPM | SYSTOLIC BLOOD PRESSURE: 153 MMHG | TEMPERATURE: 98 F

## 2023-02-15 DIAGNOSIS — M86.9 OSTEOMYELITIS OF RIGHT HAND, UNSPECIFIED TYPE (HCC): Primary | ICD-10-CM

## 2023-02-15 PROCEDURE — 96374 THER/PROPH/DIAG INJ IV PUSH: CPT

## 2023-02-16 ENCOUNTER — NURSE ONLY (OUTPATIENT)
Dept: HEMATOLOGY/ONCOLOGY | Facility: HOSPITAL | Age: 88
End: 2023-02-16
Attending: INTERNAL MEDICINE
Payer: MEDICARE

## 2023-02-16 VITALS
TEMPERATURE: 98 F | DIASTOLIC BLOOD PRESSURE: 68 MMHG | OXYGEN SATURATION: 95 % | HEART RATE: 78 BPM | RESPIRATION RATE: 18 BRPM | SYSTOLIC BLOOD PRESSURE: 142 MMHG

## 2023-02-16 DIAGNOSIS — M86.9 OSTEOMYELITIS OF RIGHT HAND, UNSPECIFIED TYPE (HCC): Primary | ICD-10-CM

## 2023-02-16 PROCEDURE — 96374 THER/PROPH/DIAG INJ IV PUSH: CPT

## 2023-02-17 ENCOUNTER — NURSE ONLY (OUTPATIENT)
Dept: HEMATOLOGY/ONCOLOGY | Facility: HOSPITAL | Age: 88
End: 2023-02-17
Attending: INTERNAL MEDICINE
Payer: MEDICARE

## 2023-02-17 VITALS
TEMPERATURE: 98 F | DIASTOLIC BLOOD PRESSURE: 78 MMHG | RESPIRATION RATE: 18 BRPM | HEART RATE: 87 BPM | OXYGEN SATURATION: 97 % | SYSTOLIC BLOOD PRESSURE: 143 MMHG

## 2023-02-17 DIAGNOSIS — M86.9 OSTEOMYELITIS OF RIGHT HAND, UNSPECIFIED TYPE (HCC): Primary | ICD-10-CM

## 2023-02-17 PROCEDURE — 96374 THER/PROPH/DIAG INJ IV PUSH: CPT

## 2023-02-18 ENCOUNTER — NURSE ONLY (OUTPATIENT)
Dept: HEMATOLOGY/ONCOLOGY | Facility: HOSPITAL | Age: 88
End: 2023-02-18
Attending: INTERNAL MEDICINE
Payer: MEDICARE

## 2023-02-18 VITALS
SYSTOLIC BLOOD PRESSURE: 151 MMHG | TEMPERATURE: 98 F | OXYGEN SATURATION: 96 % | DIASTOLIC BLOOD PRESSURE: 72 MMHG | RESPIRATION RATE: 16 BRPM | HEART RATE: 79 BPM

## 2023-02-18 DIAGNOSIS — M86.9 OSTEOMYELITIS OF RIGHT HAND, UNSPECIFIED TYPE (HCC): Primary | ICD-10-CM

## 2023-02-18 PROCEDURE — 96374 THER/PROPH/DIAG INJ IV PUSH: CPT

## 2023-02-18 NOTE — PROGRESS NOTES
Education Record    Learner:  Patient    Disease / Diagnosis: Osteomyelitis    Barriers / Limitations:  None   Comments:    Method:  Brief focused and Reinforcement   Comments:    General Topics:  Plan of care reviewed and Fall risk and prevention   Comments:    Outcome:  Shows understanding   Comments:

## 2023-02-19 ENCOUNTER — NURSE ONLY (OUTPATIENT)
Dept: HEMATOLOGY/ONCOLOGY | Facility: HOSPITAL | Age: 88
End: 2023-02-19
Attending: INTERNAL MEDICINE
Payer: MEDICARE

## 2023-02-19 VITALS
HEART RATE: 77 BPM | OXYGEN SATURATION: 95 % | DIASTOLIC BLOOD PRESSURE: 74 MMHG | RESPIRATION RATE: 18 BRPM | TEMPERATURE: 98 F | SYSTOLIC BLOOD PRESSURE: 130 MMHG

## 2023-02-19 DIAGNOSIS — M86.9 OSTEOMYELITIS OF RIGHT HAND, UNSPECIFIED TYPE (HCC): Primary | ICD-10-CM

## 2023-02-19 PROCEDURE — 96374 THER/PROPH/DIAG INJ IV PUSH: CPT

## 2023-02-19 NOTE — PROGRESS NOTES
Education Record    Learner:  Patient    Disease / Diagnosis:    Barriers / Limitations:  None   Comments:    Method:  Brief focused    Comments:    General Topics:  Medication, Plan of care reviewed   Comments:    Outcome:  Shows understanding   Comments: Pt tolerated antibiotic ivp. Pt dc home ambulatory in stable condition, no complaints. Has fu appts.

## 2023-02-20 ENCOUNTER — NURSE ONLY (OUTPATIENT)
Dept: HEMATOLOGY/ONCOLOGY | Facility: HOSPITAL | Age: 88
End: 2023-02-20
Attending: INTERNAL MEDICINE
Payer: MEDICARE

## 2023-02-20 VITALS
RESPIRATION RATE: 18 BRPM | SYSTOLIC BLOOD PRESSURE: 112 MMHG | DIASTOLIC BLOOD PRESSURE: 71 MMHG | OXYGEN SATURATION: 97 % | HEART RATE: 57 BPM | TEMPERATURE: 98 F

## 2023-02-20 DIAGNOSIS — M86.9 OSTEOMYELITIS OF RIGHT HAND, UNSPECIFIED TYPE (HCC): Primary | ICD-10-CM

## 2023-02-20 LAB
ALBUMIN SERPL-MCNC: 3.5 G/DL (ref 3.4–5)
ALBUMIN/GLOB SERPL: 1.2 {RATIO} (ref 1–2)
ALP LIVER SERPL-CCNC: 68 U/L
ALT SERPL-CCNC: 26 U/L
ANION GAP SERPL CALC-SCNC: 1 MMOL/L (ref 0–18)
AST SERPL-CCNC: 22 U/L (ref 15–37)
BASOPHILS # BLD AUTO: 0.04 X10(3) UL (ref 0–0.2)
BASOPHILS NFR BLD AUTO: 0.7 %
BILIRUB SERPL-MCNC: 0.5 MG/DL (ref 0.1–2)
BUN BLD-MCNC: 19 MG/DL (ref 7–18)
CALCIUM BLD-MCNC: 9.2 MG/DL (ref 8.5–10.1)
CHLORIDE SERPL-SCNC: 104 MMOL/L (ref 98–112)
CK SERPL-CCNC: 86 U/L
CO2 SERPL-SCNC: 32 MMOL/L (ref 21–32)
CREAT BLD-MCNC: 0.58 MG/DL
CRP SERPL-MCNC: <0.29 MG/DL (ref ?–0.3)
EOSINOPHIL # BLD AUTO: 0.12 X10(3) UL (ref 0–0.7)
EOSINOPHIL NFR BLD AUTO: 2.2 %
ERYTHROCYTE [DISTWIDTH] IN BLOOD BY AUTOMATED COUNT: 15 %
ERYTHROCYTE [SEDIMENTATION RATE] IN BLOOD: 14 MM/HR
FASTING STATUS PATIENT QL REPORTED: NO
GFR SERPLBLD BASED ON 1.73 SQ M-ARVRAT: 88 ML/MIN/1.73M2 (ref 60–?)
GLOBULIN PLAS-MCNC: 3 G/DL (ref 2.8–4.4)
GLUCOSE BLD-MCNC: 102 MG/DL (ref 70–99)
HCT VFR BLD AUTO: 39.1 %
HGB BLD-MCNC: 12.9 G/DL
IMM GRANULOCYTES # BLD AUTO: 0.01 X10(3) UL (ref 0–1)
IMM GRANULOCYTES NFR BLD: 0.2 %
LYMPHOCYTES # BLD AUTO: 0.85 X10(3) UL (ref 1–4)
LYMPHOCYTES NFR BLD AUTO: 15.9 %
MCH RBC QN AUTO: 29.8 PG (ref 26–34)
MCHC RBC AUTO-ENTMCNC: 33 G/DL (ref 31–37)
MCV RBC AUTO: 90.3 FL
MONOCYTES # BLD AUTO: 0.48 X10(3) UL (ref 0.1–1)
MONOCYTES NFR BLD AUTO: 9 %
NEUTROPHILS # BLD AUTO: 3.86 X10 (3) UL (ref 1.5–7.7)
NEUTROPHILS # BLD AUTO: 3.86 X10(3) UL (ref 1.5–7.7)
NEUTROPHILS NFR BLD AUTO: 72 %
OSMOLALITY SERPL CALC.SUM OF ELEC: 286 MOSM/KG (ref 275–295)
PLATELET # BLD AUTO: 254 10(3)UL (ref 150–450)
POTASSIUM SERPL-SCNC: 3.7 MMOL/L (ref 3.5–5.1)
PROT SERPL-MCNC: 6.5 G/DL (ref 6.4–8.2)
RBC # BLD AUTO: 4.33 X10(6)UL
SODIUM SERPL-SCNC: 137 MMOL/L (ref 136–145)
WBC # BLD AUTO: 5.4 X10(3) UL (ref 4–11)

## 2023-02-20 PROCEDURE — 96374 THER/PROPH/DIAG INJ IV PUSH: CPT

## 2023-02-20 PROCEDURE — 85025 COMPLETE CBC W/AUTO DIFF WBC: CPT

## 2023-02-20 PROCEDURE — 82550 ASSAY OF CK (CPK): CPT

## 2023-02-20 PROCEDURE — 85652 RBC SED RATE AUTOMATED: CPT

## 2023-02-20 PROCEDURE — 80053 COMPREHEN METABOLIC PANEL: CPT

## 2023-02-20 PROCEDURE — 86140 C-REACTIVE PROTEIN: CPT

## 2023-02-20 NOTE — PROGRESS NOTES
Education Record    Learner:  Patient    Disease / Diagnosis: Osteomyelitis    Barriers / Limitations:  None   Comments:    Method:  Brief focused   Comments:    General Topics:  Plan of care reviewed   Comments:    Outcome:  Shows understanding   Comments:

## 2023-02-21 ENCOUNTER — NURSE ONLY (OUTPATIENT)
Dept: HEMATOLOGY/ONCOLOGY | Facility: HOSPITAL | Age: 88
End: 2023-02-21
Attending: INTERNAL MEDICINE
Payer: MEDICARE

## 2023-02-21 VITALS
HEART RATE: 89 BPM | OXYGEN SATURATION: 97 % | TEMPERATURE: 98 F | RESPIRATION RATE: 18 BRPM | DIASTOLIC BLOOD PRESSURE: 76 MMHG | SYSTOLIC BLOOD PRESSURE: 150 MMHG

## 2023-02-21 DIAGNOSIS — M86.9 OSTEOMYELITIS OF RIGHT HAND, UNSPECIFIED TYPE (HCC): Primary | ICD-10-CM

## 2023-02-21 PROCEDURE — 96374 THER/PROPH/DIAG INJ IV PUSH: CPT

## 2023-02-21 NOTE — PROGRESS NOTES
Education Record    Learner:  Patient and Spouse    Disease / Diagnosis: osteomyelitis     Barriers / Limitations:  None   Comments:    Method:     Comments:    General Topics:  Plan of care reviewed   Comments:    Outcome:  Shows understanding   Comments:    dapto IV push.

## 2023-02-22 ENCOUNTER — NURSE ONLY (OUTPATIENT)
Dept: HEMATOLOGY/ONCOLOGY | Facility: HOSPITAL | Age: 88
End: 2023-02-22
Attending: INTERNAL MEDICINE
Payer: MEDICARE

## 2023-02-22 VITALS
OXYGEN SATURATION: 97 % | TEMPERATURE: 98 F | DIASTOLIC BLOOD PRESSURE: 65 MMHG | RESPIRATION RATE: 18 BRPM | HEART RATE: 91 BPM | SYSTOLIC BLOOD PRESSURE: 145 MMHG

## 2023-02-22 DIAGNOSIS — M86.9 OSTEOMYELITIS OF RIGHT HAND, UNSPECIFIED TYPE (HCC): Primary | ICD-10-CM

## 2023-02-22 PROCEDURE — 96374 THER/PROPH/DIAG INJ IV PUSH: CPT

## 2023-02-22 NOTE — PROGRESS NOTES
Education Record    Learner:  Patient    Disease / Diagnosis: Pt here for Daptomycin     Barriers / Limitations:  None    Method:  Brief focused, printed material and  reinforcement    General Topics:  Plan of care reviewed    Outcome:  Shows understanding

## 2023-02-23 ENCOUNTER — NURSE ONLY (OUTPATIENT)
Dept: HEMATOLOGY/ONCOLOGY | Facility: HOSPITAL | Age: 88
End: 2023-02-23
Attending: INTERNAL MEDICINE
Payer: MEDICARE

## 2023-02-23 VITALS
DIASTOLIC BLOOD PRESSURE: 76 MMHG | TEMPERATURE: 98 F | SYSTOLIC BLOOD PRESSURE: 144 MMHG | OXYGEN SATURATION: 95 % | RESPIRATION RATE: 18 BRPM | HEART RATE: 83 BPM

## 2023-02-23 DIAGNOSIS — M86.9 OSTEOMYELITIS OF RIGHT HAND, UNSPECIFIED TYPE (HCC): Primary | ICD-10-CM

## 2023-02-23 PROCEDURE — 96374 THER/PROPH/DIAG INJ IV PUSH: CPT

## 2023-02-24 ENCOUNTER — NURSE ONLY (OUTPATIENT)
Dept: HEMATOLOGY/ONCOLOGY | Facility: HOSPITAL | Age: 88
End: 2023-02-24
Attending: INTERNAL MEDICINE
Payer: MEDICARE

## 2023-02-24 VITALS
SYSTOLIC BLOOD PRESSURE: 130 MMHG | DIASTOLIC BLOOD PRESSURE: 55 MMHG | OXYGEN SATURATION: 99 % | TEMPERATURE: 98 F | HEART RATE: 81 BPM | RESPIRATION RATE: 18 BRPM

## 2023-02-24 DIAGNOSIS — M86.9 OSTEOMYELITIS OF RIGHT HAND, UNSPECIFIED TYPE (HCC): Primary | ICD-10-CM

## 2023-02-24 PROCEDURE — 96374 THER/PROPH/DIAG INJ IV PUSH: CPT

## 2023-02-25 ENCOUNTER — NURSE ONLY (OUTPATIENT)
Dept: HEMATOLOGY/ONCOLOGY | Facility: HOSPITAL | Age: 88
End: 2023-02-25
Attending: INTERNAL MEDICINE
Payer: MEDICARE

## 2023-02-25 VITALS
OXYGEN SATURATION: 99 % | TEMPERATURE: 97 F | DIASTOLIC BLOOD PRESSURE: 68 MMHG | HEART RATE: 69 BPM | SYSTOLIC BLOOD PRESSURE: 147 MMHG | RESPIRATION RATE: 18 BRPM

## 2023-02-25 DIAGNOSIS — M86.9 OSTEOMYELITIS OF RIGHT HAND, UNSPECIFIED TYPE (HCC): Primary | ICD-10-CM

## 2023-02-25 PROCEDURE — 96374 THER/PROPH/DIAG INJ IV PUSH: CPT

## 2023-02-26 ENCOUNTER — NURSE ONLY (OUTPATIENT)
Dept: HEMATOLOGY/ONCOLOGY | Facility: HOSPITAL | Age: 88
End: 2023-02-26
Attending: INTERNAL MEDICINE
Payer: MEDICARE

## 2023-02-26 VITALS
SYSTOLIC BLOOD PRESSURE: 129 MMHG | HEART RATE: 76 BPM | OXYGEN SATURATION: 98 % | DIASTOLIC BLOOD PRESSURE: 55 MMHG | TEMPERATURE: 98 F | RESPIRATION RATE: 18 BRPM

## 2023-02-26 DIAGNOSIS — M86.9 OSTEOMYELITIS OF RIGHT HAND, UNSPECIFIED TYPE (HCC): Primary | ICD-10-CM

## 2023-02-26 PROCEDURE — 96374 THER/PROPH/DIAG INJ IV PUSH: CPT

## 2023-02-27 ENCOUNTER — NURSE ONLY (OUTPATIENT)
Dept: HEMATOLOGY/ONCOLOGY | Facility: HOSPITAL | Age: 88
End: 2023-02-27
Attending: INTERNAL MEDICINE
Payer: MEDICARE

## 2023-02-27 VITALS
DIASTOLIC BLOOD PRESSURE: 75 MMHG | RESPIRATION RATE: 18 BRPM | OXYGEN SATURATION: 96 % | TEMPERATURE: 98 F | SYSTOLIC BLOOD PRESSURE: 153 MMHG | HEART RATE: 71 BPM

## 2023-02-27 DIAGNOSIS — M86.9 OSTEOMYELITIS OF RIGHT HAND, UNSPECIFIED TYPE (HCC): Primary | ICD-10-CM

## 2023-02-27 LAB
ALBUMIN SERPL-MCNC: 3.4 G/DL (ref 3.4–5)
ALBUMIN/GLOB SERPL: 1 {RATIO} (ref 1–2)
ALP LIVER SERPL-CCNC: 76 U/L
ALT SERPL-CCNC: 27 U/L
ANION GAP SERPL CALC-SCNC: 0 MMOL/L (ref 0–18)
AST SERPL-CCNC: 20 U/L (ref 15–37)
BASOPHILS # BLD AUTO: 0.07 X10(3) UL (ref 0–0.2)
BASOPHILS NFR BLD AUTO: 1.2 %
BILIRUB SERPL-MCNC: 0.4 MG/DL (ref 0.1–2)
BUN BLD-MCNC: 14 MG/DL (ref 7–18)
CALCIUM BLD-MCNC: 9.4 MG/DL (ref 8.5–10.1)
CHLORIDE SERPL-SCNC: 105 MMOL/L (ref 98–112)
CK SERPL-CCNC: 84 U/L
CO2 SERPL-SCNC: 31 MMOL/L (ref 21–32)
CREAT BLD-MCNC: 0.61 MG/DL
CRP SERPL-MCNC: <0.29 MG/DL (ref ?–0.3)
EOSINOPHIL # BLD AUTO: 0.12 X10(3) UL (ref 0–0.7)
EOSINOPHIL NFR BLD AUTO: 2.1 %
ERYTHROCYTE [DISTWIDTH] IN BLOOD BY AUTOMATED COUNT: 15 %
ERYTHROCYTE [SEDIMENTATION RATE] IN BLOOD: 10 MM/HR
FASTING STATUS PATIENT QL REPORTED: NO
GFR SERPLBLD BASED ON 1.73 SQ M-ARVRAT: 86 ML/MIN/1.73M2 (ref 60–?)
GLOBULIN PLAS-MCNC: 3.3 G/DL (ref 2.8–4.4)
GLUCOSE BLD-MCNC: 105 MG/DL (ref 70–99)
HCT VFR BLD AUTO: 37.7 %
HGB BLD-MCNC: 12.4 G/DL
IMM GRANULOCYTES # BLD AUTO: 0.02 X10(3) UL (ref 0–1)
IMM GRANULOCYTES NFR BLD: 0.3 %
LYMPHOCYTES # BLD AUTO: 1.24 X10(3) UL (ref 1–4)
LYMPHOCYTES NFR BLD AUTO: 21.4 %
MCH RBC QN AUTO: 29.7 PG (ref 26–34)
MCHC RBC AUTO-ENTMCNC: 32.9 G/DL (ref 31–37)
MCV RBC AUTO: 90.4 FL
MONOCYTES # BLD AUTO: 0.6 X10(3) UL (ref 0.1–1)
MONOCYTES NFR BLD AUTO: 10.4 %
NEUTROPHILS # BLD AUTO: 3.74 X10 (3) UL (ref 1.5–7.7)
NEUTROPHILS # BLD AUTO: 3.74 X10(3) UL (ref 1.5–7.7)
NEUTROPHILS NFR BLD AUTO: 64.6 %
OSMOLALITY SERPL CALC.SUM OF ELEC: 283 MOSM/KG (ref 275–295)
PLATELET # BLD AUTO: 275 10(3)UL (ref 150–450)
POTASSIUM SERPL-SCNC: 4.1 MMOL/L (ref 3.5–5.1)
PROT SERPL-MCNC: 6.7 G/DL (ref 6.4–8.2)
RBC # BLD AUTO: 4.17 X10(6)UL
SODIUM SERPL-SCNC: 136 MMOL/L (ref 136–145)
WBC # BLD AUTO: 5.8 X10(3) UL (ref 4–11)

## 2023-02-27 PROCEDURE — 85652 RBC SED RATE AUTOMATED: CPT

## 2023-02-27 PROCEDURE — 86140 C-REACTIVE PROTEIN: CPT

## 2023-02-27 PROCEDURE — 82550 ASSAY OF CK (CPK): CPT

## 2023-02-27 PROCEDURE — 80053 COMPREHEN METABOLIC PANEL: CPT

## 2023-02-27 PROCEDURE — 96374 THER/PROPH/DIAG INJ IV PUSH: CPT

## 2023-02-27 PROCEDURE — 85025 COMPLETE CBC W/AUTO DIFF WBC: CPT

## 2023-02-27 NOTE — PROGRESS NOTES
Education Record    Learner:  Patient and Family Member    Disease / Diagnosis: Osteomyelitis    Barriers / Limitations:  None   Comments:    Method:  Brief focused   Comments:    General Topics:  Plan of care reviewed   Comments:    Outcome:  Shows understanding   Comments:
[FreeTextEntry1] : examination and Pap smear was done.\par Contraception discussed with the patient,\par History of irregular menses ,  patient knows  she's pregnant because of morning sickness,\par UCG today is negative

## 2023-02-28 ENCOUNTER — APPOINTMENT (OUTPATIENT)
Dept: HEMATOLOGY/ONCOLOGY | Facility: HOSPITAL | Age: 88
End: 2023-02-28
Attending: INTERNAL MEDICINE
Payer: MEDICARE

## 2023-02-28 VITALS
TEMPERATURE: 99 F | HEART RATE: 91 BPM | OXYGEN SATURATION: 96 % | RESPIRATION RATE: 20 BRPM | DIASTOLIC BLOOD PRESSURE: 78 MMHG | SYSTOLIC BLOOD PRESSURE: 153 MMHG

## 2023-02-28 DIAGNOSIS — M86.9 OSTEOMYELITIS OF RIGHT HAND, UNSPECIFIED TYPE (HCC): Primary | ICD-10-CM

## 2023-02-28 PROCEDURE — 96374 THER/PROPH/DIAG INJ IV PUSH: CPT

## 2023-02-28 NOTE — PROGRESS NOTES
Education Record    Learner:  Patient and Spouse    Disease / Diagnosis: dapto iv push    Barriers / Limitations:  None   Comments:    Method:  Brief focused   Comments:    General Topics:  Plan of care reviewed   Comments:    Outcome:  Shows understanding   Comments: Tolerated well without issue.

## 2023-03-01 ENCOUNTER — NURSE ONLY (OUTPATIENT)
Dept: HEMATOLOGY/ONCOLOGY | Facility: HOSPITAL | Age: 88
End: 2023-03-01
Attending: INTERNAL MEDICINE
Payer: MEDICARE

## 2023-03-01 VITALS
DIASTOLIC BLOOD PRESSURE: 73 MMHG | HEART RATE: 81 BPM | SYSTOLIC BLOOD PRESSURE: 125 MMHG | OXYGEN SATURATION: 94 % | TEMPERATURE: 98 F | RESPIRATION RATE: 19 BRPM

## 2023-03-01 DIAGNOSIS — M86.9 OSTEOMYELITIS OF RIGHT HAND, UNSPECIFIED TYPE (HCC): Primary | ICD-10-CM

## 2023-03-01 PROCEDURE — 96374 THER/PROPH/DIAG INJ IV PUSH: CPT

## 2023-03-02 ENCOUNTER — NURSE ONLY (OUTPATIENT)
Dept: HEMATOLOGY/ONCOLOGY | Facility: HOSPITAL | Age: 88
End: 2023-03-02
Attending: INTERNAL MEDICINE
Payer: MEDICARE

## 2023-03-02 VITALS — SYSTOLIC BLOOD PRESSURE: 154 MMHG | HEART RATE: 84 BPM | DIASTOLIC BLOOD PRESSURE: 73 MMHG | OXYGEN SATURATION: 97 %

## 2023-03-02 DIAGNOSIS — M86.9 OSTEOMYELITIS OF RIGHT HAND, UNSPECIFIED TYPE (HCC): Primary | ICD-10-CM

## 2023-03-02 PROCEDURE — 96374 THER/PROPH/DIAG INJ IV PUSH: CPT

## 2023-03-02 NOTE — PROGRESS NOTES
Education Record    Learner:  Patient    Disease / Diagnosis: Here for IV antibiotics    Barriers / Limitations:  None    Method:  Brief focused, printed material and  reinforcement    General Topics:  Plan of care reviewed    Outcome:  Shows understanding. Pt tolerated treatment without incident. Left in stable condition.

## 2023-03-03 ENCOUNTER — NURSE ONLY (OUTPATIENT)
Dept: HEMATOLOGY/ONCOLOGY | Facility: HOSPITAL | Age: 88
End: 2023-03-03
Attending: INTERNAL MEDICINE
Payer: MEDICARE

## 2023-03-03 VITALS
WEIGHT: 160.19 LBS | TEMPERATURE: 97 F | BODY MASS INDEX: 27 KG/M2 | OXYGEN SATURATION: 95 % | SYSTOLIC BLOOD PRESSURE: 141 MMHG | RESPIRATION RATE: 18 BRPM | HEART RATE: 80 BPM | DIASTOLIC BLOOD PRESSURE: 77 MMHG

## 2023-03-03 DIAGNOSIS — M86.9 OSTEOMYELITIS OF RIGHT HAND, UNSPECIFIED TYPE (HCC): Primary | ICD-10-CM

## 2023-03-03 PROCEDURE — 96374 THER/PROPH/DIAG INJ IV PUSH: CPT

## 2023-03-03 NOTE — PROGRESS NOTES
Education Record    Learner:  Patient    Disease / Diagnosis: Osteomyelitis of right hand    Barriers / Limitations:  None   Comments:    Method:  Brief focused and Reinforcement   Comments:    General Topics:  Plan of care reviewed and Fall risk and prevention   Comments:    Outcome:  Shows understanding   Comments:

## 2023-03-04 ENCOUNTER — NURSE ONLY (OUTPATIENT)
Dept: HEMATOLOGY/ONCOLOGY | Facility: HOSPITAL | Age: 88
End: 2023-03-04
Attending: INTERNAL MEDICINE
Payer: MEDICARE

## 2023-03-04 VITALS
OXYGEN SATURATION: 97 % | RESPIRATION RATE: 18 BRPM | HEART RATE: 78 BPM | SYSTOLIC BLOOD PRESSURE: 118 MMHG | TEMPERATURE: 98 F | DIASTOLIC BLOOD PRESSURE: 72 MMHG

## 2023-03-04 DIAGNOSIS — M86.9 OSTEOMYELITIS OF RIGHT HAND, UNSPECIFIED TYPE (HCC): Primary | ICD-10-CM

## 2023-03-04 PROCEDURE — 96374 THER/PROPH/DIAG INJ IV PUSH: CPT

## 2023-03-04 NOTE — PROGRESS NOTES
Education Record    Learner:  Patient    Disease / Diagnosis:pt here for cubicin    Barriers / Limitations:  None    Method:  Brief focused, printed material and  reinforcement    General Topics:  Plan of care reviewed    Outcome: pt tolerated treatment with no c /o.

## 2023-03-05 ENCOUNTER — NURSE ONLY (OUTPATIENT)
Dept: HEMATOLOGY/ONCOLOGY | Facility: HOSPITAL | Age: 88
End: 2023-03-05
Attending: INTERNAL MEDICINE
Payer: MEDICARE

## 2023-03-05 VITALS
RESPIRATION RATE: 18 BRPM | OXYGEN SATURATION: 95 % | TEMPERATURE: 98 F | SYSTOLIC BLOOD PRESSURE: 110 MMHG | DIASTOLIC BLOOD PRESSURE: 70 MMHG | HEART RATE: 71 BPM

## 2023-03-05 DIAGNOSIS — M86.9 OSTEOMYELITIS OF RIGHT HAND, UNSPECIFIED TYPE (HCC): Primary | ICD-10-CM

## 2023-03-05 PROCEDURE — 96374 THER/PROPH/DIAG INJ IV PUSH: CPT

## 2023-03-05 NOTE — PROGRESS NOTES
Education Record    Learner:  Patient    Disease / Diagnosis: osteomyelitis    Barriers / Limitations:  None    Method:  Brief focused, printed material and  reinforcement    General Topics:  Plan of care reviewed    Outcome:  Shows understanding    daptomycin given. No complaints.

## 2023-03-06 ENCOUNTER — TELEPHONE (OUTPATIENT)
Dept: HEMATOLOGY/ONCOLOGY | Facility: HOSPITAL | Age: 88
End: 2023-03-06

## 2023-03-06 ENCOUNTER — NURSE ONLY (OUTPATIENT)
Dept: HEMATOLOGY/ONCOLOGY | Facility: HOSPITAL | Age: 88
End: 2023-03-06
Attending: INTERNAL MEDICINE
Payer: MEDICARE

## 2023-03-06 VITALS
DIASTOLIC BLOOD PRESSURE: 74 MMHG | SYSTOLIC BLOOD PRESSURE: 147 MMHG | TEMPERATURE: 98 F | HEART RATE: 83 BPM | OXYGEN SATURATION: 94 % | RESPIRATION RATE: 18 BRPM

## 2023-03-06 DIAGNOSIS — M86.9 OSTEOMYELITIS OF RIGHT HAND, UNSPECIFIED TYPE (HCC): Primary | ICD-10-CM

## 2023-03-06 LAB
ALBUMIN SERPL-MCNC: 3.3 G/DL (ref 3.4–5)
ALBUMIN/GLOB SERPL: 1 {RATIO} (ref 1–2)
ALP LIVER SERPL-CCNC: 67 U/L
ALT SERPL-CCNC: 29 U/L
ANION GAP SERPL CALC-SCNC: 3 MMOL/L (ref 0–18)
AST SERPL-CCNC: 21 U/L (ref 15–37)
BASOPHILS # BLD AUTO: 0.04 X10(3) UL (ref 0–0.2)
BASOPHILS NFR BLD AUTO: 0.7 %
BILIRUB SERPL-MCNC: 0.4 MG/DL (ref 0.1–2)
BUN BLD-MCNC: 16 MG/DL (ref 7–18)
CALCIUM BLD-MCNC: 9.2 MG/DL (ref 8.5–10.1)
CHLORIDE SERPL-SCNC: 105 MMOL/L (ref 98–112)
CK SERPL-CCNC: 70 U/L
CO2 SERPL-SCNC: 29 MMOL/L (ref 21–32)
CREAT BLD-MCNC: 0.56 MG/DL
CRP SERPL-MCNC: <0.29 MG/DL (ref ?–0.3)
EOSINOPHIL # BLD AUTO: 0.09 X10(3) UL (ref 0–0.7)
EOSINOPHIL NFR BLD AUTO: 1.7 %
ERYTHROCYTE [DISTWIDTH] IN BLOOD BY AUTOMATED COUNT: 14.7 %
ERYTHROCYTE [SEDIMENTATION RATE] IN BLOOD: 21 MM/HR
FASTING STATUS PATIENT QL REPORTED: NO
GFR SERPLBLD BASED ON 1.73 SQ M-ARVRAT: 88 ML/MIN/1.73M2 (ref 60–?)
GLOBULIN PLAS-MCNC: 3.4 G/DL (ref 2.8–4.4)
GLUCOSE BLD-MCNC: 95 MG/DL (ref 70–99)
HCT VFR BLD AUTO: 38.7 %
HGB BLD-MCNC: 12.5 G/DL
IMM GRANULOCYTES # BLD AUTO: 0.02 X10(3) UL (ref 0–1)
IMM GRANULOCYTES NFR BLD: 0.4 %
LYMPHOCYTES # BLD AUTO: 1.17 X10(3) UL (ref 1–4)
LYMPHOCYTES NFR BLD AUTO: 21.5 %
MCH RBC QN AUTO: 29.5 PG (ref 26–34)
MCHC RBC AUTO-ENTMCNC: 32.3 G/DL (ref 31–37)
MCV RBC AUTO: 91.3 FL
MONOCYTES # BLD AUTO: 0.58 X10(3) UL (ref 0.1–1)
MONOCYTES NFR BLD AUTO: 10.6 %
NEUTROPHILS # BLD AUTO: 3.55 X10 (3) UL (ref 1.5–7.7)
NEUTROPHILS # BLD AUTO: 3.55 X10(3) UL (ref 1.5–7.7)
NEUTROPHILS NFR BLD AUTO: 65.1 %
OSMOLALITY SERPL CALC.SUM OF ELEC: 285 MOSM/KG (ref 275–295)
PLATELET # BLD AUTO: 285 10(3)UL (ref 150–450)
POTASSIUM SERPL-SCNC: 4 MMOL/L (ref 3.5–5.1)
PROT SERPL-MCNC: 6.7 G/DL (ref 6.4–8.2)
RBC # BLD AUTO: 4.24 X10(6)UL
SODIUM SERPL-SCNC: 137 MMOL/L (ref 136–145)
WBC # BLD AUTO: 5.5 X10(3) UL (ref 4–11)

## 2023-03-06 PROCEDURE — 85652 RBC SED RATE AUTOMATED: CPT

## 2023-03-06 PROCEDURE — 86140 C-REACTIVE PROTEIN: CPT

## 2023-03-06 PROCEDURE — 80053 COMPREHEN METABOLIC PANEL: CPT

## 2023-03-06 PROCEDURE — 96374 THER/PROPH/DIAG INJ IV PUSH: CPT

## 2023-03-06 PROCEDURE — 82550 ASSAY OF CK (CPK): CPT

## 2023-03-06 PROCEDURE — 85025 COMPLETE CBC W/AUTO DIFF WBC: CPT

## 2023-03-06 NOTE — PROGRESS NOTES
Per Nicola Edgar - CARY at [facility]. [facility] office  - patient has received all 42 doses (original order Dapto daily x 6 weeks) and does not need a 43rd dose on 3/7.  OK to pull the picc line today 3/6 or have the patient return on 3/7, for picc line removal.

## 2023-03-06 NOTE — TELEPHONE ENCOUNTER
Pt informed that Norm Aguirre, pharmacist, clarified with pt's MD that today was last daptomycin dose. Pt informed she can come back today to have PICC removed or keep her appointment as scheduled for tomorrow.  Pt aware if she comes tomorrow it will only be fore PICC removal. Pt states she will call back if she'd like to come today before 4pm for PICC removal.

## 2023-03-06 NOTE — PROGRESS NOTES
Education Record    Learner:  Patient and Family Member    Disease / Diagnosis: Here for labs, daptomycin and PICC dressing    Barriers / Limitations:  None   Comments:    Method:  Discussion   Comments:    General Topics:  Plan of care reviewed   Comments:    Outcome:  Shows understanding   Comments:    Labs draw per protocol without incident. Daptomycin given per MD order without incident. Tolerated well. Pt declined PICC dressing change today due to scheduled appointment for last treatment and PICC removal tomorrow. Reviewed next appointment discharged home in stable condition, no new complaints. Per Norm Aguirre pharmacist, pt has received 42 doses and should be done today and will clarify with MD since his notes state pt done 3/7.    1500: pt returned to cancer center to have PICC line removed today. PICC removed per protocol without incident. Tip intact. Site intact. Discharged home in stable condition, no new complaints.

## 2023-03-07 ENCOUNTER — APPOINTMENT (OUTPATIENT)
Dept: HEMATOLOGY/ONCOLOGY | Facility: HOSPITAL | Age: 88
End: 2023-03-07
Attending: INTERNAL MEDICINE
Payer: MEDICARE

## (undated) DEVICE — SLEEVE COMPR M KNEE LEN SGL USE KENDALL SCD

## (undated) DEVICE — STERILE POLYISOPRENE POWDER-FREE SURGICAL GLOVES: Brand: PROTEXIS

## (undated) DEVICE — SUTURE ETHLN SZ 4-0 L18IN NABSRB BLK L19MM

## (undated) DEVICE — VIOLET BRAIDED (POLYGLACTIN 910), SYNTHETIC ABSORBABLE SUTURE: Brand: COATED VICRYL

## (undated) DEVICE — STANDARD HYPODERMIC NEEDLE,POLYPROPYLENE HUB: Brand: MONOJECT

## (undated) DEVICE — 3M™ IOBAN™ 2 ANTIMICROBIAL INCISE DRAPE 6650EZ: Brand: IOBAN™ 2

## (undated) DEVICE — HOOK LOCK LATEX FREE ELASTIC BANDAGE 4INX5YD

## (undated) DEVICE — GOWN SURG AERO CHROME XXL

## (undated) DEVICE — BANDAGE,GAUZE,CONFORMING,3X4.1Y,STRL,LF: Brand: MEDLINE

## (undated) DEVICE — BLADE SAW KM33-11

## (undated) DEVICE — LOWER EXTREMITY CDS-LF: Brand: MEDLINE INDUSTRIES, INC.

## (undated) DEVICE — 3M™ STERI-DRAPE™ U-DRAPE 1015: Brand: STERI-DRAPE™

## (undated) DEVICE — Device

## (undated) DEVICE — SYRINGE BULB 50/CS 48/PLT: Brand: MEDEGEN MEDICAL PRODUCTS, LLC

## (undated) DEVICE — STERILE SYNTHETIC POLYISOPRENE POWDER-FREE SURGICAL GLOVES WITH HYDROGEL COATING, SMOOTH FINISH, STRAIGHT FINGER: Brand: PROTEXIS

## (undated) DEVICE — SOLUTION IRRIG 1000ML 0.9% NACL USP BTL

## (undated) DEVICE — ELECTRODE ES RET 2 PATE W/ 10FT CRD MPLR DISP

## (undated) DEVICE — SPONGE GZ W4XL4IN 100% COT 12 PLY TYP VII WVN

## (undated) NOTE — LETTER
2/20/2023              Mary Ellen Sinclair        [address]         Dear Devon Cherry,      It was a pleasure to see you at our [facility] office. Your lab tests were normal, your inflammation markers were normal.  There is no need for further testing at this time. I look forward to seeing you at your next scheduled appointment. Yours Truly,    Deandre Jennings MD    Document electronically generated by: Leonora Viera MD

## (undated) NOTE — LETTER
21    Patient: Sherri Michele  : 10/23/1935 Visit date: 3/19/2021    Dear  Greg Butt MD    Thank you for referring Sherri Michele to my practice. Please find my assessment and plan below.      History of Present Illness     49-year-old female decide to proceed with laparoscopic cholecystectomy with intraoperative cholangiogram    Assessment   Porcelain gallbladder  (primary encounter diagnosis)  Calculus of gallbladder without cholecystitis without obstruction        Plan   Laparoscopic cholecy